# Patient Record
Sex: FEMALE | Race: WHITE | Employment: FULL TIME | URBAN - METROPOLITAN AREA
[De-identification: names, ages, dates, MRNs, and addresses within clinical notes are randomized per-mention and may not be internally consistent; named-entity substitution may affect disease eponyms.]

---

## 2017-03-08 ENCOUNTER — EMPLOYEE WELLNESS (OUTPATIENT)
Dept: OTHER | Age: 32
End: 2017-03-08

## 2017-07-19 ENCOUNTER — OFFICE VISIT (OUTPATIENT)
Dept: FAMILY MEDICINE CLINIC | Age: 32
End: 2017-07-19

## 2017-07-19 VITALS
HEART RATE: 72 BPM | OXYGEN SATURATION: 98 % | SYSTOLIC BLOOD PRESSURE: 104 MMHG | WEIGHT: 165 LBS | DIASTOLIC BLOOD PRESSURE: 60 MMHG | RESPIRATION RATE: 16 BRPM

## 2017-07-19 DIAGNOSIS — K64.9 HEMORRHOIDS, UNSPECIFIED HEMORRHOID TYPE: ICD-10-CM

## 2017-07-19 DIAGNOSIS — Z00.00 ANNUAL PHYSICAL EXAM: Primary | ICD-10-CM

## 2017-07-19 DIAGNOSIS — K59.00 CONSTIPATION, UNSPECIFIED CONSTIPATION TYPE: ICD-10-CM

## 2017-07-19 PROCEDURE — 99385 PREV VISIT NEW AGE 18-39: CPT | Performed by: FAMILY MEDICINE

## 2018-03-20 VITALS — WEIGHT: 157 LBS

## 2018-05-08 ENCOUNTER — EMPLOYEE WELLNESS (OUTPATIENT)
Dept: OTHER | Age: 33
End: 2018-05-08

## 2018-05-08 LAB
CHOLESTEROL, TOTAL: 163 MG/DL (ref 0–199)
GLUCOSE BLD-MCNC: 102 MG/DL (ref 70–99)
HDLC SERPL-MCNC: 44 MG/DL (ref 40–60)
LDL CHOLESTEROL CALCULATED: 97 MG/DL
TRIGL SERPL-MCNC: 108 MG/DL (ref 0–150)

## 2018-05-14 VITALS — WEIGHT: 166 LBS

## 2018-09-12 ENCOUNTER — TELEPHONE (OUTPATIENT)
Dept: FAMILY MEDICINE CLINIC | Age: 33
End: 2018-09-12

## 2018-09-12 ENCOUNTER — OFFICE VISIT (OUTPATIENT)
Dept: FAMILY MEDICINE CLINIC | Age: 33
End: 2018-09-12

## 2018-09-12 VITALS
WEIGHT: 171 LBS | SYSTOLIC BLOOD PRESSURE: 122 MMHG | OXYGEN SATURATION: 99 % | DIASTOLIC BLOOD PRESSURE: 70 MMHG | HEART RATE: 75 BPM | HEIGHT: 67 IN | BODY MASS INDEX: 26.84 KG/M2

## 2018-09-12 DIAGNOSIS — Z23 NEED FOR DIPHTHERIA-TETANUS-PERTUSSIS (TDAP) VACCINE: ICD-10-CM

## 2018-09-12 DIAGNOSIS — Z23 NEED FOR HEPATITIS A VACCINATION: ICD-10-CM

## 2018-09-12 DIAGNOSIS — Z71.84 COUNSELING ABOUT TRAVEL: ICD-10-CM

## 2018-09-12 DIAGNOSIS — Z01.84 IMMUNITY TO HEPATITIS B VIRUS DEMONSTRATED BY SEROLOGIC TEST: ICD-10-CM

## 2018-09-12 DIAGNOSIS — R73.01 IFG (IMPAIRED FASTING GLUCOSE): Primary | ICD-10-CM

## 2018-09-12 DIAGNOSIS — Z23 NEEDS FLU SHOT: ICD-10-CM

## 2018-09-12 LAB — HBV SURFACE AB TITR SER: 18.48 MIU/ML

## 2018-09-12 PROCEDURE — 90632 HEPA VACCINE ADULT IM: CPT | Performed by: FAMILY MEDICINE

## 2018-09-12 PROCEDURE — 99213 OFFICE O/P EST LOW 20 MIN: CPT | Performed by: FAMILY MEDICINE

## 2018-09-12 PROCEDURE — 36415 COLL VENOUS BLD VENIPUNCTURE: CPT | Performed by: FAMILY MEDICINE

## 2018-09-12 PROCEDURE — 90471 IMMUNIZATION ADMIN: CPT | Performed by: FAMILY MEDICINE

## 2018-09-12 PROCEDURE — 90682 RIV4 VACC RECOMBINANT DNA IM: CPT | Performed by: FAMILY MEDICINE

## 2018-09-12 PROCEDURE — 90472 IMMUNIZATION ADMIN EACH ADD: CPT | Performed by: FAMILY MEDICINE

## 2018-09-12 PROCEDURE — 90715 TDAP VACCINE 7 YRS/> IM: CPT | Performed by: FAMILY MEDICINE

## 2018-09-12 RX ORDER — MEFLOQUINE HYDROCHLORIDE 250 MG/1
TABLET ORAL
Qty: 8 TABLET | Refills: 0 | Status: ON HOLD | OUTPATIENT
Start: 2018-09-12 | End: 2020-08-02

## 2018-09-12 RX ORDER — AZITHROMYCIN 250 MG/1
TABLET, FILM COATED ORAL
Qty: 6 TABLET | Refills: 0 | Status: SHIPPED | OUTPATIENT
Start: 2018-09-12 | End: 2019-07-02

## 2018-09-12 ASSESSMENT — PATIENT HEALTH QUESTIONNAIRE - PHQ9
2. FEELING DOWN, DEPRESSED OR HOPELESS: 0
1. LITTLE INTEREST OR PLEASURE IN DOING THINGS: 0
SUM OF ALL RESPONSES TO PHQ QUESTIONS 1-9: 0
SUM OF ALL RESPONSES TO PHQ9 QUESTIONS 1 & 2: 0
SUM OF ALL RESPONSES TO PHQ QUESTIONS 1-9: 0

## 2018-09-12 NOTE — PROGRESS NOTES
Vaccine Information Sheet, \"Influenza - Inactivated\"  given to Ernestina Green, or parent/legal guardian of  Ernestina Green and verbalized understanding. Patient responses:    Have you ever had a reaction to a flu vaccine? No  Are you able to eat eggs without adverse effects? Yes  Do you have any current illness? No  Have you ever had Guillian [place] Syndrome? No    Flu vaccine given per order. Please see immunization tab.

## 2018-09-12 NOTE — TELEPHONE ENCOUNTER
Pls tell her that I read more about traveler's diarrhea. A minimum of 72 hrs has to pass between her last typhoid vaccine pill and taking the abx. So, since she'll finish the 4 doses of the vaccine before she travels, there won't be any issue with taking an abx if she develops diarrhea. I sent in a rx for azithromycin to her [facility]. At her visit, I mentioned cipro, but azithromycin is a better choice.  Thx.

## 2018-09-12 NOTE — PROGRESS NOTES
Patient: Marley Arita is a 35 y.o. female who presents today with the following Chief Complaint(s):  Chief Complaint   Patient presents with    Annual Exam         HPI: Is here for [facility] follow up as fbs was 102. No sx/sx of high or low bs. Is traveling to [facility], [facility] and the [facility] soon. Leaves 10/27/18. According to [facility] travel site, pt needs Tdap, flu shots, hep A to complete routine vaccines. Needs maleria prophylaxis for [facility], mefloquine 250 mg 1 qwk starting 1 wk before travel, during and for 4 wk after travel. . Also needs typhoid prophylaxis. PO is more effective rickey killed injetable version, and pt will take 1 qod x 4 days at least 1 wk prior to travel. Current Outpatient Prescriptions   Medication Sig Dispense Refill    mefloquine (LARIAM) 250 MG tablet 1 po q week starting 1 wk before travel, each week of travel and for 4 wks after travel. 8 tablet 0    typhoid vaccine (VIVOTIF) delayed release capsule 1 po qod x 4 doses. Take at least 1 wk before travel. 4 capsule 0    azithromycin (ZITHROMAX) 250 MG tablet 1 po bid x 3 day for traveler's diarrhea 6 tablet 0     No current facility-administered medications for this visit. Patient's past medical history, surgical history, family history, medications,  and allergies  were all reviewed and updated as appropriate today. Review of Systems   Constitutional: Negative for fatigue and fever. Endocrine: Negative for polydipsia, polyphagia and polyuria. Psychiatric/Behavioral: Negative for dysphoric mood. The patient is not nervous/anxious. Physical Exam   Constitutional: She is oriented to person, place, and time. She appears well-developed and well-nourished. HENT:   Head: Normocephalic and atraumatic. Eyes: Conjunctivae and EOM are normal. No scleral icterus. Pulmonary/Chest: Effort normal.   Musculoskeletal: Normal range of motion. Neurological: She is alert and oriented to person, place, and time. Skin: Skin is warm and dry. Psychiatric: She has a normal mood and affect. Her behavior is normal. Judgment and thought content normal.         /70 (Site: Left Upper Arm, Position: Sitting, Cuff Size: Medium Adult)   Pulse 75   Ht 5' 7\" (1.702 m)   Wt 171 lb (77.6 kg)   LMP 08/29/2018   SpO2 99%   BMI 26.78 kg/m²     Assessment/Plan:    Kat Benson was seen today for annual exam.    Diagnoses and all orders for this visit:    IFG (impaired fasting glucose)  -     Hemoglobin A1C    Needs flu shot  -     INFLUENZA, QUADV, RECOMBINANT, 18 YRS AND OLDER, IM, PF, PREFILL SYR OR SDV, 0.5ML (FLUBLOK QUADV, PF)    Need for diphtheria-tetanus-pertussis (Tdap) vaccine  -     Tdap (age 6y and older) IM ([employer])    Counseling about travel  -     mefloquine (LARIAM) 250 MG tablet; 1 po q week starting 1 wk before travel, each week of travel and for 4 wks after travel. -     typhoid vaccine (VIVOTIF) delayed release capsule; 1 po qod x 4 doses. Take at least 1 wk before travel.  -     azithromycin (ZITHROMAX) 250 MG tablet; 1 po bid x 3 day for traveler's diarrhea    Need for hepatitis A vaccination  -     Hep A Vaccine Adult (HAVRIX), 2nd vaccine in 6 mo. Immunity to hepatitis B virus demonstrated by serologic test  -     HEPATITIS B SURFACE ANTIBODY EMPLOYEE   Pt believes she has had vaccine x 3.

## 2018-09-13 LAB
ESTIMATED AVERAGE GLUCOSE: 85.3 MG/DL
HBA1C MFR BLD: 4.6 %

## 2018-11-27 ENCOUNTER — OFFICE VISIT (OUTPATIENT)
Dept: DERMATOLOGY | Age: 33
End: 2018-11-27
Payer: COMMERCIAL

## 2018-11-27 DIAGNOSIS — L72.0 EPIDERMAL CYST: Primary | ICD-10-CM

## 2018-11-27 PROCEDURE — 99213 OFFICE O/P EST LOW 20 MIN: CPT | Performed by: DERMATOLOGY

## 2018-11-27 RX ORDER — CEPHALEXIN 500 MG/1
CAPSULE ORAL
Qty: 28 CAPSULE | Refills: 0 | Status: SHIPPED | OUTPATIENT
Start: 2018-11-27 | End: 2019-07-02

## 2018-11-27 NOTE — PROGRESS NOTES
if lesion becomes more inflamed. Discussed with patient that if it is increasing in size, lesion needs excision or biopsy.

## 2019-05-24 ENCOUNTER — EMPLOYEE WELLNESS (OUTPATIENT)
Dept: OTHER | Age: 34
End: 2019-05-24

## 2019-05-24 LAB
CHOLESTEROL, TOTAL: 190 MG/DL (ref 0–199)
GLUCOSE BLD-MCNC: 107 MG/DL (ref 70–99)
HDLC SERPL-MCNC: 53 MG/DL (ref 40–60)
LDL CHOLESTEROL CALCULATED: 119 MG/DL
TRIGL SERPL-MCNC: 89 MG/DL (ref 0–150)

## 2019-05-28 VITALS — WEIGHT: 177 LBS | BODY MASS INDEX: 27.72 KG/M2

## 2019-07-02 ENCOUNTER — OFFICE VISIT (OUTPATIENT)
Dept: ORTHOPEDIC SURGERY | Age: 34
End: 2019-07-02
Payer: COMMERCIAL

## 2019-07-02 VITALS — BODY MASS INDEX: 27.79 KG/M2 | HEIGHT: 67 IN | RESPIRATION RATE: 15 BRPM | WEIGHT: 177.03 LBS

## 2019-07-02 DIAGNOSIS — S62.667A CLOSED NONDISPLACED FRACTURE OF DISTAL PHALANX OF LEFT LITTLE FINGER, INITIAL ENCOUNTER: ICD-10-CM

## 2019-07-02 DIAGNOSIS — M79.645 FINGER PAIN, LEFT: Primary | ICD-10-CM

## 2019-07-02 PROCEDURE — 99202 OFFICE O/P NEW SF 15 MIN: CPT | Performed by: ORTHOPAEDIC SURGERY

## 2019-07-02 NOTE — PROGRESS NOTES
normal    Circulation: well perfused    Additional Comments:     Additional Examinations:  Right Upper Extremity:  Examination of the right upper extremity does not show any tenderness, deformity or injury. Range of motion is unremarkable. There is no gross instability. There are no rashes, ulcerations or lesions. Strength and tone are normal.       Radiology:     X-rays obtained and reviewed in office:  Views 3 views  Location left small finger  Impression rays demonstrate well-maintained alignment and IP and MP joint congruence, but there is what appears to be a nondisplaced fracture through the tuft of the left small finger distal phalanx      Assessment: Left small finger crush injury with nondisplaced distal phalanx tuft fracture    Impression:   Encounter Diagnoses   Name Primary?  Finger pain, left Yes    Closed nondisplaced fracture of distal phalanx of left little finger, initial encounter        Office Procedures:  Orders Placed This Encounter   Procedures    XR FINGER LEFT (MIN 2 VIEWS)     Standing Status:   Future     Number of Occurrences:   1     Standing Expiration Date:   7/2/2020     Order Specific Question:   Reason for exam:     Answer:   LT SMALL FINGER       Treatment Plan: I believe this will heal nicely with simple conservative care. We talked about self limiting activities to minimize repeated aggravation, use of bandaging and simple wound care, and even Covan wrapping. 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 I will be happy to see her back as needed moving forward. Please note that this transcription was created using voice recognition software. Any errors are unintentional and may be due to voice recognition transcription.

## 2019-12-06 LAB
C. TRACHOMATIS, EXTERNAL RESULT: NEGATIVE
N. GONORRHOEAE, EXTERNAL RESULT: NEGATIVE

## 2020-02-07 LAB
HEP B, EXTERNAL RESULT: NEGATIVE
HEPATITIS C ANTIBODY, EXTERNAL RESULT: NEGATIVE
RPR, EXTERNAL RESULT: NONREACTIVE
RUBELLA TITER, EXTERNAL RESULT: NORMAL

## 2020-04-27 ENCOUNTER — HOSPITAL ENCOUNTER (OUTPATIENT)
Dept: OBGYN | Age: 35
Discharge: HOME | End: 2020-04-27

## 2020-04-27 PROCEDURE — 9900000105 HC CHILDBIRTH 2 E-CLASS

## 2020-07-17 LAB — GBS, EXTERNAL RESULT: NEGATIVE

## 2020-07-28 ENCOUNTER — OFFICE VISIT (OUTPATIENT)
Dept: PRIMARY CARE CLINIC | Age: 35
End: 2020-07-28
Payer: COMMERCIAL

## 2020-07-28 PROCEDURE — 99211 OFF/OP EST MAY X REQ PHY/QHP: CPT | Performed by: NURSE PRACTITIONER

## 2020-07-28 NOTE — PROGRESS NOTES
Patient presented to [facility] drive up clinic for preop testing. Patient was swabbed and given information advising them to remain isolated until procedure date.

## 2020-07-29 LAB
SARS-COV-2: NOT DETECTED
SOURCE: NORMAL

## 2020-08-02 ENCOUNTER — HOSPITAL ENCOUNTER (INPATIENT)
Age: 35
LOS: 2 days | Discharge: HOME | End: 2020-08-04
Attending: OBSTETRICS & GYNECOLOGY | Admitting: OBSTETRICS & GYNECOLOGY
Payer: COMMERCIAL

## 2020-08-02 ENCOUNTER — ANESTHESIA (OUTPATIENT)
Dept: LABOR AND DELIVERY | Age: 35
End: 2020-08-02
Payer: COMMERCIAL

## 2020-08-02 ENCOUNTER — ANESTHESIA EVENT (OUTPATIENT)
Dept: LABOR AND DELIVERY | Age: 35
End: 2020-08-02
Payer: COMMERCIAL

## 2020-08-02 PROBLEM — Z37.9 NORMAL LABOR: Status: ACTIVE | Noted: 2020-08-02

## 2020-08-02 LAB
ABO/RH: NORMAL
AMNIOTEST, POC: NORMAL
AMPHETAMINE SCREEN, URINE: NORMAL
ANTIBODY SCREEN: NORMAL
BARBITURATE SCREEN URINE: NORMAL
BASOPHILS ABSOLUTE: 0.1 K/UL (ref 0–0.2)
BASOPHILS RELATIVE PERCENT: 0.6 %
BENZODIAZEPINE SCREEN, URINE: NORMAL
BUPRENORPHINE URINE: NORMAL
CANNABINOID SCREEN URINE: NORMAL
COCAINE METABOLITE SCREEN URINE: NORMAL
EOSINOPHILS ABSOLUTE: 0.1 K/UL (ref 0–0.6)
EOSINOPHILS RELATIVE PERCENT: 1 %
HCT VFR BLD CALC: 37.8 % (ref 36–48)
HEMOGLOBIN: 13.4 G/DL (ref 12–16)
LYMPHOCYTES ABSOLUTE: 1.8 K/UL (ref 1–5.1)
LYMPHOCYTES RELATIVE PERCENT: 16.6 %
Lab: NORMAL
Lab: NORMAL
MCH RBC QN AUTO: 32.9 PG (ref 26–34)
MCHC RBC AUTO-ENTMCNC: 35.4 G/DL (ref 31–36)
MCV RBC AUTO: 93 FL (ref 80–100)
METHADONE SCREEN, URINE: NORMAL
MONOCYTES ABSOLUTE: 0.8 K/UL (ref 0–1.3)
MONOCYTES RELATIVE PERCENT: 7.2 %
NEGATIVE QC PASS/FAIL: NORMAL
NEUTROPHILS ABSOLUTE: 7.9 K/UL (ref 1.7–7.7)
NEUTROPHILS RELATIVE PERCENT: 74.6 %
OPIATE SCREEN URINE: NORMAL
OXYCODONE URINE: NORMAL
PDW BLD-RTO: 14.1 % (ref 12.4–15.4)
PH UA: 6
PHENCYCLIDINE SCREEN URINE: NORMAL
PLATELET # BLD: 233 K/UL (ref 135–450)
PMV BLD AUTO: 7.8 FL (ref 5–10.5)
POSITIVE QC PASS/FAIL: NORMAL
PROPOXYPHENE SCREEN: NORMAL
RBC # BLD: 4.06 M/UL (ref 4–5.2)
TOTAL SYPHILLIS IGG/IGM: NORMAL
WBC # BLD: 10.6 K/UL (ref 4–11)

## 2020-08-02 PROCEDURE — 6360000002 HC RX W HCPCS: Performed by: ANESTHESIOLOGY

## 2020-08-02 PROCEDURE — 59025 FETAL NON-STRESS TEST: CPT

## 2020-08-02 PROCEDURE — 3700000025 EPIDURAL BLOCK: Performed by: ANESTHESIOLOGY

## 2020-08-02 PROCEDURE — 2500000003 HC RX 250 WO HCPCS: Performed by: NURSE ANESTHETIST, CERTIFIED REGISTERED

## 2020-08-02 PROCEDURE — 2580000003 HC RX 258: Performed by: OBSTETRICS & GYNECOLOGY

## 2020-08-02 PROCEDURE — 86901 BLOOD TYPING SEROLOGIC RH(D): CPT

## 2020-08-02 PROCEDURE — 86780 TREPONEMA PALLIDUM: CPT

## 2020-08-02 PROCEDURE — 7200000001 HC VAGINAL DELIVERY

## 2020-08-02 PROCEDURE — 85025 COMPLETE CBC W/AUTO DIFF WBC: CPT

## 2020-08-02 PROCEDURE — 2500000003 HC RX 250 WO HCPCS: Performed by: OBSTETRICS & GYNECOLOGY

## 2020-08-02 PROCEDURE — 86850 RBC ANTIBODY SCREEN: CPT

## 2020-08-02 PROCEDURE — 2500000003 HC RX 250 WO HCPCS

## 2020-08-02 PROCEDURE — 86900 BLOOD TYPING SEROLOGIC ABO: CPT

## 2020-08-02 PROCEDURE — 0KQM0ZZ REPAIR PERINEUM MUSCLE, OPEN APPROACH: ICD-10-PCS | Performed by: OBSTETRICS & GYNECOLOGY

## 2020-08-02 PROCEDURE — 10H07YZ INSERTION OF OTHER DEVICE INTO PRODUCTS OF CONCEPTION, VIA NATURAL OR ARTIFICIAL OPENING: ICD-10-PCS | Performed by: OBSTETRICS & GYNECOLOGY

## 2020-08-02 PROCEDURE — 6360000002 HC RX W HCPCS: Performed by: OBSTETRICS & GYNECOLOGY

## 2020-08-02 PROCEDURE — 51701 INSERT BLADDER CATHETER: CPT

## 2020-08-02 PROCEDURE — 80307 DRUG TEST PRSMV CHEM ANLYZR: CPT

## 2020-08-02 PROCEDURE — 1220000000 HC SEMI PRIVATE OB R&B

## 2020-08-02 PROCEDURE — 10907ZC DRAINAGE OF AMNIOTIC FLUID, THERAPEUTIC FROM PRODUCTS OF CONCEPTION, VIA NATURAL OR ARTIFICIAL OPENING: ICD-10-PCS | Performed by: OBSTETRICS & GYNECOLOGY

## 2020-08-02 RX ORDER — SODIUM CHLORIDE 0.9 % (FLUSH) 0.9 %
10 SYRINGE (ML) INJECTION EVERY 12 HOURS SCHEDULED
Status: DISCONTINUED | OUTPATIENT
Start: 2020-08-02 | End: 2020-08-02 | Stop reason: HOSPADM

## 2020-08-02 RX ORDER — ONDANSETRON 2 MG/ML
4 INJECTION INTRAMUSCULAR; INTRAVENOUS EVERY 6 HOURS PRN
Status: DISCONTINUED | OUTPATIENT
Start: 2020-08-02 | End: 2020-08-02 | Stop reason: HOSPADM

## 2020-08-02 RX ORDER — DOCUSATE SODIUM 100 MG/1
100 CAPSULE, LIQUID FILLED ORAL 2 TIMES DAILY
Status: DISCONTINUED | OUTPATIENT
Start: 2020-08-02 | End: 2020-08-03 | Stop reason: SDUPTHER

## 2020-08-02 RX ORDER — ACETAMINOPHEN 325 MG/1
650 TABLET ORAL EVERY 4 HOURS PRN
Status: DISCONTINUED | OUTPATIENT
Start: 2020-08-02 | End: 2020-08-03 | Stop reason: SDUPTHER

## 2020-08-02 RX ORDER — NALOXONE HYDROCHLORIDE 0.4 MG/ML
0.4 INJECTION, SOLUTION INTRAMUSCULAR; INTRAVENOUS; SUBCUTANEOUS PRN
Status: DISCONTINUED | OUTPATIENT
Start: 2020-08-02 | End: 2020-08-02 | Stop reason: HOSPADM

## 2020-08-02 RX ORDER — DIPHENHYDRAMINE HYDROCHLORIDE 50 MG/ML
25 INJECTION INTRAMUSCULAR; INTRAVENOUS EVERY 6 HOURS PRN
Status: DISCONTINUED | OUTPATIENT
Start: 2020-08-02 | End: 2020-08-02 | Stop reason: HOSPADM

## 2020-08-02 RX ORDER — EPHEDRINE SULFATE 50 MG/ML
INJECTION INTRAVENOUS PRN
Status: DISCONTINUED | OUTPATIENT
Start: 2020-08-02 | End: 2020-08-02 | Stop reason: SDUPTHER

## 2020-08-02 RX ORDER — SODIUM CHLORIDE, SODIUM LACTATE, POTASSIUM CHLORIDE, CALCIUM CHLORIDE 600; 310; 30; 20 MG/100ML; MG/100ML; MG/100ML; MG/100ML
INJECTION, SOLUTION INTRAVENOUS CONTINUOUS
Status: DISCONTINUED | OUTPATIENT
Start: 2020-08-02 | End: 2020-08-03

## 2020-08-02 RX ORDER — NALBUPHINE HCL 10 MG/ML
5 AMPUL (ML) INJECTION EVERY 4 HOURS PRN
Status: DISCONTINUED | OUTPATIENT
Start: 2020-08-02 | End: 2020-08-02 | Stop reason: HOSPADM

## 2020-08-02 RX ORDER — SODIUM CHLORIDE 0.9 % (FLUSH) 0.9 %
10 SYRINGE (ML) INJECTION PRN
Status: DISCONTINUED | OUTPATIENT
Start: 2020-08-02 | End: 2020-08-02 | Stop reason: HOSPADM

## 2020-08-02 RX ORDER — EPHEDRINE SULFATE 50 MG/ML
INJECTION INTRAVENOUS
Status: COMPLETED
Start: 2020-08-02 | End: 2020-08-02

## 2020-08-02 RX ADMIN — EPHEDRINE SULFATE 10 MG: 50 INJECTION INTRAVENOUS at 11:51

## 2020-08-02 RX ADMIN — ONDANSETRON 4 MG: 2 INJECTION INTRAMUSCULAR; INTRAVENOUS at 18:38

## 2020-08-02 RX ADMIN — Medication 5 ML: at 08:35

## 2020-08-02 RX ADMIN — SODIUM CHLORIDE, POTASSIUM CHLORIDE, SODIUM LACTATE AND CALCIUM CHLORIDE: 600; 310; 30; 20 INJECTION, SOLUTION INTRAVENOUS at 11:11

## 2020-08-02 RX ADMIN — SODIUM CHLORIDE, POTASSIUM CHLORIDE, SODIUM LACTATE AND CALCIUM CHLORIDE: 600; 310; 30; 20 INJECTION, SOLUTION INTRAVENOUS at 16:26

## 2020-08-02 RX ADMIN — Medication 5 ML: at 08:30

## 2020-08-02 RX ADMIN — FAMOTIDINE 20 MG: 10 INJECTION, SOLUTION INTRAVENOUS at 18:33

## 2020-08-02 RX ADMIN — Medication 1 MILLI-UNITS/MIN: at 09:30

## 2020-08-02 RX ADMIN — SODIUM CHLORIDE, POTASSIUM CHLORIDE, SODIUM LACTATE AND CALCIUM CHLORIDE: 600; 310; 30; 20 INJECTION, SOLUTION INTRAVENOUS at 06:13

## 2020-08-02 RX ADMIN — EPHEDRINE SULFATE 10 MG: 50 INJECTION INTRAVENOUS at 11:45

## 2020-08-02 RX ADMIN — SODIUM CHLORIDE, POTASSIUM CHLORIDE, SODIUM LACTATE AND CALCIUM CHLORIDE: 600; 310; 30; 20 INJECTION, SOLUTION INTRAVENOUS at 08:29

## 2020-08-02 ASSESSMENT — PAIN DESCRIPTION - DESCRIPTORS
DESCRIPTORS: CRAMPING
DESCRIPTORS: SHARP
DESCRIPTORS: SHARP
DESCRIPTORS: CRAMPING
DESCRIPTORS: SHARP
DESCRIPTORS: CRAMPING

## 2020-08-02 NOTE — FLOWSHEET NOTE
Pt admitted to labor room 9181 with FOB at bedside. Baby on monitor, pt experiencing contractions and tolerable. All consents discussed and signed. No further needs at this time, call light in reach will cont to monitor.

## 2020-08-02 NOTE — PROGRESS NOTES
Pt comfortable s/p epidural.    FHT: 145, mod variability, +accels, no decels  [place]: q 3-5 min  SVE: 4+/90/-2, AROM with small amount clear fluid    Begin Pitocin for augmentation.

## 2020-08-02 NOTE — FLOWSHEET NOTE
Pt transferred ambulatory to room 2286 for admission of labor. Report to HARRIS Celis RN to assume care

## 2020-08-02 NOTE — PROGRESS NOTES
Increasing fetal heart rate with intermittent variable and early decels. Pt comfortable. FHT: 160, mod variability, +acels, early and variable decels  [place]: q 3 min  SVE: 4-5/90 with puffy anterior lip/0, some molding    IUPC placed. Discussed concerns with lack of cervical change and fetal baseline. Continue to monitor closely.

## 2020-08-02 NOTE — FLOWSHEET NOTE
Pitt cath placed at this time and return of 600cc clear yellow urine. Pt positioned to right side. Pt denies needs or pain. Popsicle provided.

## 2020-08-02 NOTE — ANESTHESIA PROCEDURE NOTES
Epidural Block    Patient location during procedure: OB  Start time: 8/2/2020 8:17 AM  End time: 8/2/2020 8:45 AM  Reason for block: labor epidural  Staffing  Anesthesiologist: Reza Bocanegra MD  Resident/CRNA: Arville Duane, APRN - CRNA  Performed: resident/CRNA   Preanesthetic Checklist  Completed: patient identified, site marked, surgical consent, pre-op evaluation, timeout performed, IV checked, risks and benefits discussed, monitors and equipment checked, anesthesia consent given, oxygen available and patient being monitored  Epidural  Patient position: sitting  Prep: Betadine and site prepped and draped  Patient monitoring: continuous pulse ox and frequent blood pressure checks  Approach: midline  Location: lumbar (1-5) (L3-4)  Injection technique: ANGEL saline  Provider prep: mask and sterile gloves  Needle  Needle type: Tuohy   Needle gauge: 17 G  Needle length: 3.5 in  Needle insertion depth: 6 cm  Catheter type: side hole  Catheter size: 19 G  Catheter at skin depth: 12 cm  Test dose: negative  Assessment  Sensory level: T6  Hemodynamics: stable  Attempts: 1

## 2020-08-02 NOTE — FLOWSHEET NOTE
08/02/20 0730   Fetal Heart Rate   Mode External US   Baseline Rate 145 bpm   Baseline Classification Normal   Variability 6-25 BPM   Pattern A   Patient Feels Fetal Movement Yes   Uterine Activity   Mode Palpation; Lares   Contraction Frequency 2-3   Contraction Duration 60   Contraction Quality Mild; Moderate   Resting Tone Palpated Soft

## 2020-08-02 NOTE — FLOWSHEET NOTE
Dr Lenell Closs viewed tracing. Plan to recheck cervix at approx 1500 to evaluate for cervical change. Pt updated on plan of care. Pt verbalized understanding. Pt positioned to sit up for a little bit. HOB raised to semi fowlers. Spouse remains at bedside. Pt denies pain or needs at this time.

## 2020-08-02 NOTE — FLOWSHEET NOTE
Dr Bryanna Stanford called and informed of 27 yo  EDC  EGA 44 presenting with c/o of leaking of fluid earlier but none recently and contractions. Pt with contractions q 1.5-2.5 x50-90,  +ltv&accels noted, SVE 4/70/-2 BOW felt on exam and amniotest was negative and vaginal vault felt very dry with no leaking of fluid, GBS negative.   New orders received

## 2020-08-02 NOTE — ANESTHESIA PRE PROCEDURE
part of Cervix removed , LEEP       Social History:    Social History     Tobacco Use    Smoking status: Never Smoker    Smokeless tobacco: Never Used   Substance Use Topics    Alcohol use: Yes     Comment: 6 per wk                                Counseling given: Not Answered      Vital Signs (Current):   Vitals:    08/02/20 0803 08/02/20 0828 08/02/20 0830 08/02/20 0832   BP: 128/76 128/79 (!) 140/86 120/70   Pulse: 97 93 81 96   Resp: 18 16 16 16   Temp:       TempSrc:       Weight:       Height:                                                  BP Readings from Last 3 Encounters:   08/02/20 120/70   09/12/18 122/70   07/19/17 104/60       NPO Status:                                                                                 BMI:   Wt Readings from Last 3 Encounters:   08/02/20 191 lb 3.2 oz (86.7 kg)   07/02/19 177 lb 0.5 oz (80.3 kg)   05/24/19 177 lb (80.3 kg)     Body mass index is 29.5 kg/m². CBC:   Lab Results   Component Value Date    WBC 10.6 08/02/2020    RBC 4.06 08/02/2020    HGB 13.4 08/02/2020    HCT 37.8 08/02/2020    MCV 93.0 08/02/2020    RDW 14.1 08/02/2020     08/02/2020       CMP:   Lab Results   Component Value Date    GLUCOSE 107 05/24/2019       POC Tests: No results for input(s): POCGLU, POCNA, POCK, POCCL, POCBUN, POCHEMO, POCHCT in the last 72 hours.     Coags: No results found for: PROTIME, INR, APTT    HCG (If Applicable): No results found for: PREGTESTUR, PREGSERUM, HCG, HCGQUANT     ABGs: No results found for: PHART, PO2ART, WLV3VQT, PED5KWG, BEART, J0JTZUGA     Type & Screen (If Applicable):  No results found for: LABABO, LABRH    Drug/Infectious Status (If Applicable):  No results found for: HIV, HEPCAB    COVID-19 Screening (If Applicable):   Lab Results   Component Value Date    COVID19 Not Detected 07/28/2020         Anesthesia Evaluation  Patient summary reviewed no history of anesthetic complications:   Airway: Mallampati: II  TM distance: >3 FB   Neck ROM: full  Mouth opening: > = 3 FB Dental: normal exam         Pulmonary:Negative Pulmonary ROS and normal exam                               Cardiovascular:Negative CV ROS  Exercise tolerance: good (>4 METS),           Rhythm: regular  Rate: normal           Beta Blocker:  Not on Beta Blocker         Neuro/Psych:                ROS comment: Scoliosis diagnosed as a teen, no surgery/issues GI/Hepatic/Renal: Neg GI/Hepatic/Renal ROS            Endo/Other: Negative Endo/Other ROS                    Abdominal:           Vascular: negative vascular ROS. Anesthesia Plan      epidural     ASA 1             Anesthetic plan and risks discussed with patient. Use of blood products discussed with patient whom consented to blood products.                    BLANCA Ivy - CRNA   8/2/2020

## 2020-08-02 NOTE — PROGRESS NOTES
Pt feeling some contractions on the right side of her abdomen. No pelvic pressure. FHT: 150, mod variability, +accels, rare subtle late  [place]: q 2-3 min  SVE: 9/100/0 to +1; OP    Pt repositioned. Reassess shortly.

## 2020-08-02 NOTE — FLOWSHEET NOTE
Dr García Shoulders at bedside. MD states SVE unchanged, scant clear fluid noted to pad, pad changed. Pt positioned to left side at this time. Pt denies pain or needs. Order obtained for bermudez cath when next need for str cath d/t unchanged cervix.

## 2020-08-02 NOTE — H&P
Department of Obstetrics and Gynecology   Obstetrics History and Physical        CHIEF COMPLAINT:  contractions, leakage of amniotic fluid    HISTORY OF PRESENT ILLNESS:    Rafi Galarza  is a 28 y.o.  female at 39w0d presents with a chief complaint as above and is being admitted for early labor. She presented with small LOF but was found to be intact on admission. She was paige every 2-3 minutes. No VB, good FM. 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 Possible contaminant.      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 , LEEP     Allergies:  Sulfa antibiotics  Social History:    Social History     Socioeconomic History    Marital status:      Spouse name: Not on file    Number of children: Not on file    Years of education: Not on file    Highest education level: Not on file   Occupational History    Not on file   Social Needs    Financial resource strain: Not on file    Food insecurity     Worry: Not on file     Inability: Not on file    Transportation needs     Medical: Not on file     Non-medical: Not on file   Tobacco Use    Smoking status: Never Smoker    Smokeless tobacco: Never Used   Substance and Sexual Activity    Alcohol use: Yes     Comment: 6 per wk    Drug use: No    Sexual activity: Not on file   Lifestyle    Physical activity     Days per week: Not on file     Minutes per session: Not on file    Stress: Not on file   Relationships    Social connections     Talks on phone: Not on file     Gets together: Not on file     Attends Latter-day service: Not on file     Active member of club or organization: Not on file     Attends meetings of clubs or organizations: Not on file     Relationship status: Not on file    Intimate partner violence     Fear of current or ex partner: Not on file     Emotionally abused: Not on file     Physically abused: Not on file     Forced sexual activity: Not on file   Other Topics Concern    Not on file   Social History Narrative    Not on file     Family History:       Problem Relation Age of Onset    High Blood Pressure Mother     Cancer Maternal Aunt         breast     High Blood Pressure Maternal Grandmother     Diabetes Father     Cancer Paternal Grandfather         lung     Medications Prior to Admission:  Medications Prior to Admission: [DISCONTINUED] mefloquine (LARIAM) 250 MG tablet, 1 po q week starting 1 wk before travel, each week of travel and for 4 wks after travel. [DISCONTINUED] typhoid vaccine (VIVOTIF) delayed release capsule, 1 po qod x 4 doses. Take at least 1 wk before travel. REVIEW OF SYSTEMS:  negative     PHYSICAL EXAM:    Vitals:    08/02/20 0803 08/02/20 0828 08/02/20 0830 08/02/20 0832   BP: 128/76 128/79 (!) 140/86 120/70   Pulse: 97 93 81 96   Resp: 18 16 16 16   Temp:       TempSrc:       Weight:       Height:         General appearance:  awake, alert, cooperative, no apparent distress, and appears stated age  Neurologic:  Awake, alert, oriented to name, place and time.     Lungs:  No increased work of breathing, good air exchange  Abdomen:  Soft, non tender, gravid, fundal height consistent with the gestational age, EFW by Leopold's maneuvers is 7 lb 8 oz  Pelvis:  Adequate pelvis  Cervix: 4/70/-2 with BBOW on admission  Contraction frequency: every 2-4 minutes  Membranes:  Intact  Labs: GBS negative, COVID negative     ASSESSMENT:  Pt is a 34y/o

## 2020-08-02 NOTE — PLAN OF CARE
Problem: Pain:  Description: Pain management should include both nonpharmacologic and pharmacologic interventions.   Goal: Pain level will decrease  Description: Pain level will decrease  Outcome: Ongoing  Goal: Control of acute pain  Description: Control of acute pain  Outcome: Ongoing  Goal: Control of chronic pain  Description: Control of chronic pain  Outcome: Ongoing     Problem: Anxiety:  Goal: Level of anxiety will decrease  Description: Level of anxiety will decrease  Outcome: Ongoing     Problem: Breathing Pattern - Ineffective:  Goal: Able to breathe comfortably  Description: Able to breathe comfortably  Outcome: Ongoing     Problem: Fluid Volume - Imbalance:  Goal: Absence of imbalanced fluid volume signs and symptoms  Description: Absence of imbalanced fluid volume signs and symptoms  Outcome: Ongoing  Goal: Absence of intrapartum hemorrhage signs and symptoms  Description: Absence of intrapartum hemorrhage signs and symptoms  Outcome: Ongoing     Problem: Infection - Intrapartum Infection:  Goal: Will show no infection signs and symptoms  Description: Will show no infection signs and symptoms  Outcome: Ongoing     Problem: Labor Process - Prolonged:  Goal: Labor progression, first stage, within specified pattern  Description: Labor progression, first stage, within specified pattern  Outcome: Ongoing  Goal: Labor progession, second stage, within specified pattern  Description: Labor progession, second stage, within specified pattern  Outcome: Ongoing  Goal: Uterine contractions within specified parameters  Description: Uterine contractions within specified parameters  Outcome: Ongoing     Problem:  Screening:  Goal: Ability to make informed decisions regarding treatment has improved  Description: Ability to make informed decisions regarding treatment has improved  Outcome: Ongoing     Problem: Pain - Acute:  Goal: Able to cope with pain  Description: Able to cope with pain  Outcome: Ongoing Problem: Tissue Perfusion - Uteroplacental, Altered:  Description: [TRUNCATED] For intrapartum patients with recurrent variable decelerations of the fetal heart rate, consider transcervical amnioinfusion. For patients in labor, avoid prophylactic use of continuous maternal oxygen supplementation to prevent nonreassu . ..   Goal: Absence of abnormal fetal heart rate pattern  Description: Absence of abnormal fetal heart rate pattern  Outcome: Ongoing     Problem: Urinary Retention:  Goal: Experiences of bladder distention will decrease  Description: Experiences of bladder distention will decrease  Outcome: Ongoing  Goal: Urinary elimination within specified parameters  Description: Urinary elimination within specified parameters  Outcome: Ongoing

## 2020-08-02 NOTE — FLOWSHEET NOTE
Birthing ball  Being used at bedside and pt leaning forward onto bed. EFM tracing maternal HR at times. Spot check of -150. Pt states she is still tolerating ctx but will want and epi sometime soon.

## 2020-08-03 PROCEDURE — 1220000000 HC SEMI PRIVATE OB R&B

## 2020-08-03 PROCEDURE — 6370000000 HC RX 637 (ALT 250 FOR IP): Performed by: OBSTETRICS & GYNECOLOGY

## 2020-08-03 RX ORDER — DOCUSATE SODIUM 100 MG/1
100 CAPSULE, LIQUID FILLED ORAL 2 TIMES DAILY
Status: DISCONTINUED | OUTPATIENT
Start: 2020-08-03 | End: 2020-08-04 | Stop reason: HOSPADM

## 2020-08-03 RX ORDER — ONDANSETRON 4 MG/1
8 TABLET, ORALLY DISINTEGRATING ORAL EVERY 8 HOURS PRN
Status: DISCONTINUED | OUTPATIENT
Start: 2020-08-03 | End: 2020-08-04 | Stop reason: HOSPADM

## 2020-08-03 RX ORDER — LANOLIN 100 %
OINTMENT (GRAM) TOPICAL PRN
Status: DISCONTINUED | OUTPATIENT
Start: 2020-08-03 | End: 2020-08-04 | Stop reason: HOSPADM

## 2020-08-03 RX ORDER — ONDANSETRON 2 MG/ML
4 INJECTION INTRAMUSCULAR; INTRAVENOUS EVERY 6 HOURS PRN
Status: DISCONTINUED | OUTPATIENT
Start: 2020-08-03 | End: 2020-08-04 | Stop reason: HOSPADM

## 2020-08-03 RX ORDER — HYDROCODONE BITARTRATE AND ACETAMINOPHEN 5; 325 MG/1; MG/1
2 TABLET ORAL EVERY 4 HOURS PRN
Status: DISCONTINUED | OUTPATIENT
Start: 2020-08-03 | End: 2020-08-04 | Stop reason: HOSPADM

## 2020-08-03 RX ORDER — SODIUM CHLORIDE, SODIUM LACTATE, POTASSIUM CHLORIDE, CALCIUM CHLORIDE 600; 310; 30; 20 MG/100ML; MG/100ML; MG/100ML; MG/100ML
INJECTION, SOLUTION INTRAVENOUS CONTINUOUS
Status: DISCONTINUED | OUTPATIENT
Start: 2020-08-03 | End: 2020-08-04 | Stop reason: HOSPADM

## 2020-08-03 RX ORDER — ACETAMINOPHEN 325 MG/1
650 TABLET ORAL EVERY 4 HOURS PRN
Status: DISCONTINUED | OUTPATIENT
Start: 2020-08-03 | End: 2020-08-04 | Stop reason: HOSPADM

## 2020-08-03 RX ORDER — HYDROCODONE BITARTRATE AND ACETAMINOPHEN 5; 325 MG/1; MG/1
1 TABLET ORAL EVERY 4 HOURS PRN
Status: DISCONTINUED | OUTPATIENT
Start: 2020-08-03 | End: 2020-08-04 | Stop reason: HOSPADM

## 2020-08-03 RX ORDER — SODIUM CHLORIDE 0.9 % (FLUSH) 0.9 %
10 SYRINGE (ML) INJECTION PRN
Status: DISCONTINUED | OUTPATIENT
Start: 2020-08-03 | End: 2020-08-04 | Stop reason: HOSPADM

## 2020-08-03 RX ORDER — SODIUM CHLORIDE 0.9 % (FLUSH) 0.9 %
10 SYRINGE (ML) INJECTION EVERY 12 HOURS SCHEDULED
Status: DISCONTINUED | OUTPATIENT
Start: 2020-08-03 | End: 2020-08-04 | Stop reason: HOSPADM

## 2020-08-03 RX ORDER — IBUPROFEN 400 MG/1
800 TABLET ORAL EVERY 6 HOURS PRN
Status: DISCONTINUED | OUTPATIENT
Start: 2020-08-03 | End: 2020-08-04 | Stop reason: HOSPADM

## 2020-08-03 RX ADMIN — BENZOCAINE AND LEVOMENTHOL: 200; 5 SPRAY TOPICAL at 18:56

## 2020-08-03 RX ADMIN — ACETAMINOPHEN 650 MG: 325 TABLET ORAL at 18:57

## 2020-08-03 RX ADMIN — ACETAMINOPHEN 650 MG: 325 TABLET ORAL at 07:56

## 2020-08-03 RX ADMIN — IBUPROFEN 800 MG: 400 TABLET, FILM COATED ORAL at 22:41

## 2020-08-03 RX ADMIN — IBUPROFEN 800 MG: 400 TABLET, FILM COATED ORAL at 04:41

## 2020-08-03 RX ADMIN — DOCUSATE SODIUM 100 MG: 100 CAPSULE, LIQUID FILLED ORAL at 22:41

## 2020-08-03 RX ADMIN — IBUPROFEN 800 MG: 400 TABLET, FILM COATED ORAL at 14:41

## 2020-08-03 ASSESSMENT — PAIN DESCRIPTION - LOCATION
LOCATION: PERINEUM

## 2020-08-03 ASSESSMENT — PAIN SCALES - GENERAL
PAINLEVEL_OUTOF10: 3
PAINLEVEL_OUTOF10: 1
PAINLEVEL_OUTOF10: 3
PAINLEVEL_OUTOF10: 3
PAINLEVEL_OUTOF10: 0
PAINLEVEL_OUTOF10: 4
PAINLEVEL_OUTOF10: 3
PAINLEVEL_OUTOF10: 1

## 2020-08-03 ASSESSMENT — PAIN DESCRIPTION - PAIN TYPE
TYPE: ACUTE PAIN

## 2020-08-03 ASSESSMENT — PAIN DESCRIPTION - PROGRESSION
CLINICAL_PROGRESSION: GRADUALLY IMPROVING
CLINICAL_PROGRESSION: GRADUALLY IMPROVING
CLINICAL_PROGRESSION: GRADUALLY WORSENING
CLINICAL_PROGRESSION: GRADUALLY WORSENING
CLINICAL_PROGRESSION: RESOLVED

## 2020-08-03 ASSESSMENT — PAIN DESCRIPTION - ONSET
ONSET: ON-GOING

## 2020-08-03 ASSESSMENT — PAIN DESCRIPTION - DESCRIPTORS
DESCRIPTORS: SORE

## 2020-08-03 ASSESSMENT — PAIN DESCRIPTION - ORIENTATION
ORIENTATION: MID

## 2020-08-03 ASSESSMENT — PAIN - FUNCTIONAL ASSESSMENT
PAIN_FUNCTIONAL_ASSESSMENT: ACTIVITIES ARE NOT PREVENTED

## 2020-08-03 ASSESSMENT — PAIN DESCRIPTION - FREQUENCY
FREQUENCY: INTERMITTENT
FREQUENCY: CONTINUOUS
FREQUENCY: INTERMITTENT
FREQUENCY: CONTINUOUS
FREQUENCY: INTERMITTENT

## 2020-08-03 NOTE — FLOWSHEET NOTE
patient prefers that I come back to perform evening assessment. Will come later to do as patient is resting now.

## 2020-08-03 NOTE — ANESTHESIA POSTPROCEDURE EVALUATION
Department of Anesthesiology  Postprocedure Note    Patient: Bianka Claudio  MRN: [id number]  YOB: 1985  Date of evaluation: 8/3/2020  Time:  9:05 AM     Procedure Summary     Date:  08/02/20 Room / Location:      Anesthesia Start:  3872 Anesthesia Stop:  2014    Procedure:  Labor Analgesia Diagnosis:      Scheduled Providers:   Responsible Provider:  Rocio Munoz MD    Anesthesia Type:  epidural ASA Status:  1          Anesthesia Type: epidural    Ninoska Phase I: Ninoska Score: 10    Ninoska Phase II: Ninoska Score: 10    Last vitals: Reviewed and per EMR flowsheets.        Anesthesia Post Evaluation    Patient location during evaluation: floor  Patient participation: complete - patient participated  Level of consciousness: awake and alert  Pain score: 2  Airway patency: patent  Nausea & Vomiting: no vomiting and no nausea  Complications: no  Cardiovascular status: hemodynamically stable  Respiratory status: room air, spontaneous ventilation and acceptable  Hydration status: stable

## 2020-08-03 NOTE — ANESTHESIA POSTPROCEDURE EVALUATION
Department of Anesthesiology  Postprocedure Note    Patient: Arun Negron  MRN: [id number]  YOB: 1985  Date of evaluation: 8/3/2020  Time:  3:53 AM     Procedure Summary     Date:  08/02/20 Room / Location:      Anesthesia Start:  2421 Anesthesia Stop:  2014    Procedure:  Labor Analgesia Diagnosis:      Scheduled Providers:   Responsible Provider:  Tiffany Felder MD    Anesthesia Type:  epidural ASA Status:  1          Anesthesia Type: epidural    Ninoska Phase I: Ninoska Score: 10    Ninoska Phase II: Ninoska Score: 10    Last vitals: Reviewed and per EMR flowsheets.        Anesthesia Post Evaluation    Patient location during evaluation: PACU  Patient participation: complete - patient participated  Level of consciousness: awake and alert  Pain score: 0  Airway patency: patent  Nausea & Vomiting: no vomiting and no nausea  Complications: no  Cardiovascular status: blood pressure returned to baseline and hemodynamically stable  Respiratory status: acceptable and room air  Hydration status: stable

## 2020-08-03 NOTE — PLAN OF CARE
Problem: Pain:  Description: Pain management should include both nonpharmacologic and pharmacologic interventions.   Goal: Pain level will decrease  Description: Pain level will decrease  Outcome: Ongoing  Goal: Control of acute pain  Description: Control of acute pain  Outcome: Ongoing  Goal: Control of chronic pain  Description: Control of chronic pain  Outcome: Ongoing     Problem: Anxiety:  Goal: Level of anxiety will decrease  Description: Level of anxiety will decrease  Outcome: Ongoing     Problem: Breathing Pattern - Ineffective:  Goal: Able to breathe comfortably  Description: Able to breathe comfortably  Outcome: Ongoing     Problem: Fluid Volume - Imbalance:  Goal: Absence of imbalanced fluid volume signs and symptoms  Description: Absence of imbalanced fluid volume signs and symptoms  Outcome: Ongoing  Goal: Absence of intrapartum hemorrhage signs and symptoms  Description: Absence of intrapartum hemorrhage signs and symptoms  Outcome: Ongoing  Goal: Absence of postpartum hemorrhage signs and symptoms  Description: Absence of postpartum hemorrhage signs and symptoms  8/3/2020 0834 by Ping Gordon RN  Outcome: Ongoing  8/3/2020 0631 by Myron العراقي RN  Outcome: Ongoing     Problem: Infection - Intrapartum Infection:  Goal: Will show no infection signs and symptoms  Description: Will show no infection signs and symptoms  8/3/2020 0834 by Ping Gordon RN  Outcome: Ongoing  8/3/2020 0631 by Myron العراقي RN  Outcome: Ongoing     Problem: Labor Process - Prolonged:  Goal: Labor progression, first stage, within specified pattern  Description: Labor progression, first stage, within specified pattern  Outcome: Ongoing  Goal: Labor progession, second stage, within specified pattern  Description: Labor progession, second stage, within specified pattern  Outcome: Ongoing  Goal: Uterine contractions within specified parameters  Description: Uterine contractions within specified parameters  Outcome: Ongoing     Problem: Mood - Altered:  Goal: Mood stable  Description: Mood stable  8/3/2020 0834 by Kylah Natarajan RN  Outcome: Ongoing  8/3/2020 0631 by Crystal Prado RN  Outcome: Ongoing

## 2020-08-03 NOTE — FLOWSHEET NOTE
Pt assisted OOB with 1 assist to bathroom. Pt unable to void. Lucia instruction given and demonstrated. Pt verbalized understanding of instructions. Epidural removed with tip intact at this time. IV saline locked at this time. Mother assisted to wheelchair and infant placed in mothers arms. Both transferred to [facility] room 2259. Pt oriented to new room, infant bassinet,  care. Call light in reach. Patient instructed to call this RN if she feels the need to void. Will continue to monitor. English

## 2020-08-03 NOTE — DISCHARGE SUMMARY
Department of Obstetrics and Gynecology  Postpartum Discharge Summary      Admit Date: 2020    Admit Diagnosis: Normal labor @ 44 0/7 w   Discharge Date: 2020    Discharge Diagnoses: spontaneous vaginal delivery    There are no discharge medications for this patient. Service: Obstetrics    Consults: none    Significant Diagnostic Studies: none    Postpartum complications: none     Condition at Discharge: good    Hospital Course: uncomplicated    Discharge Instructions: Activity: as tolerated    Diet: regular diet    Instructions: No intercourse and nothing in the vagina for 6 weeks. Do not drive while using pain medications.  Keep any wounds clean and dry    Discharge to: Home    Disposition / Follow up: Return to office in 6 weeks    Home Health Nurse visit within 24-48 h if qualifies    [place] Data:  Weight   Information for the patient's :  Rasta Green [[id number]]        [address] for the patient's :  Rasta Norma [[id number]]         Home with mother    Electronically signed by Saida Jerome MD on 8/3/2020 at 12:10 PM

## 2020-08-03 NOTE — FLOWSHEET NOTE
of viable male infant at this time. Infant placed on mom's abdomen, dried and stimulated with spontaneous lusty cry. Cord clamped and cut, delayed cord clamping per Dr Joseph Moise. Infant placed skin to skin with mom. Warm blanket, hat, and diaper applied, Apgars 9/9. See delivery summary and  delivery details.

## 2020-08-03 NOTE — FLOWSHEET NOTE
Mom has flat nipples and has been using shells for past 8 weeks. Unable to help Mom express colostrum. Encouraged to continue to attempt breast feeding every 2 hours. Discussed use of nipple shield and pump - Mom will ing to use whatever devices necessary to breast feed infant. Mom also aware that Lactation will be in to assess and help her during the day today.

## 2020-08-03 NOTE — L&D DELIVERY NOTE
Department of Obstetrics and Gynecology  Spontaneous Vaginal Delivery Note      Eryn Cunningham NNYUVRU,[id number]    PRENATAL CARE: Complicated by: AMA, h/o LEEP    Pre-operative Diagnosis:  early labor and SROM      Post-operative Diagnosis: The same    Additional Procedures: uterine exploration and perineal laceration repair     Information for the patient's :   [[id number]]                    Infant Wt:   Information for the patient's :   [[id number]]            APGARS:     Information for the patient's :   [[id number]]           Anesthesia:  epidural anesthesia    Specimen:  Placenta sent to pathology No    Estimated blood loss: 300 cc     Condition: mother and infant stable in the L&D room    Infant Feeding: breast    Delivery Summary: Patient is Nina Trivedi is a 28 y.o.  female at 39w0d admitted to Labor and Delivery room and placed on external monitors. Contractions were regular, FHT was reactive with moderate variability without decels. Patient did received epidural anesthesia. Labor progressed as anticipated to fully dilated cervix. With subsequent contractions she started pushing and delivered vaginally spontaneously with no complications. The right hand was delivered near the infant's shoulder. 10 Units of Pitocin in 500 ml of LR was started IV. Placenta, cord and membranes were delivered spontaneously within less than 15 minutes. Uterus was explored. The cervix, vagina and perineum were examined and second degree perineal laceration was repaired in the regular fashion with Vicryl. Uterus was well contracted. Vaginal sweep was done, laps count was correct. Mother and  left stable to recovery. Viable male infant, Apgars 9/9, weight pending skin-to-skin.       Electronically signed by Vanessa Kidd MD on 2020 at 8:36 PM

## 2020-08-03 NOTE — FLOWSHEET NOTE
MOrning assessment complete. Pt reports pain well controlled on oral meds - mostly just soreness in perineum and hemorrhoids. Pt provided fresh ice packs and has been using silvio bottle and tucks pads for comfort/care. Abd soft and fundus firm u/u. Pt reports scant/small bleeding and no clots.  Pt denies needs at this time and planning to order breakfast.

## 2020-08-03 NOTE — FLOWSHEET NOTE
Patient ambulate to bathroom. Patient able to void 250ml blood tinged urine. Patient performed pericare on self. Patient ambulated back to bed without difficulty. No needs verbalized at this time.

## 2020-08-03 NOTE — PLAN OF CARE
Problem: Fluid Volume - Imbalance:  Goal: Absence of postpartum hemorrhage signs and symptoms  Description: Absence of postpartum hemorrhage signs and symptoms  Outcome: Ongoing     Problem: Infection - Intrapartum Infection:  Goal: Will show no infection signs and symptoms  Description: Will show no infection signs and symptoms  Outcome: Ongoing     Problem: Discharge Planning:  Goal: Discharged to appropriate level of care  Description: Discharged to appropriate level of care  Outcome: Ongoing     Problem: Constipation:  Goal: Bowel elimination is within specified parameters  Description: Bowel elimination is within specified parameters  Outcome: Ongoing     Problem: Infection - Risk of, Puerperal Infection:  Goal: Will show no infection signs and symptoms  Description: Will show no infection signs and symptoms  Outcome: Ongoing     Problem: Mood - Altered:  Goal: Mood stable  Description: Mood stable  Outcome: Ongoing

## 2020-08-03 NOTE — FLOWSHEET NOTE
Patient's desires up to BR. Patient assisted up x 1 assistant and ambulated without difficulty to BR. Patient unable to void. Patient performed pericare on self. Patient assisted back to bed and call light within reach. Patient instructed that she has to void by 0200 or this RN will straight cath to empty bladder. Patient instructed to call for assistance with next void. Patient verbalized understanding.

## 2020-08-04 VITALS
WEIGHT: 191.2 LBS | DIASTOLIC BLOOD PRESSURE: 74 MMHG | HEIGHT: 68 IN | TEMPERATURE: 97.9 F | BODY MASS INDEX: 28.98 KG/M2 | OXYGEN SATURATION: 96 % | HEART RATE: 78 BPM | RESPIRATION RATE: 16 BRPM | SYSTOLIC BLOOD PRESSURE: 104 MMHG

## 2020-08-04 PROCEDURE — 6370000000 HC RX 637 (ALT 250 FOR IP): Performed by: OBSTETRICS & GYNECOLOGY

## 2020-08-04 RX ADMIN — DOCUSATE SODIUM 100 MG: 100 CAPSULE, LIQUID FILLED ORAL at 09:55

## 2020-08-04 RX ADMIN — ACETAMINOPHEN 650 MG: 325 TABLET ORAL at 02:57

## 2020-08-04 RX ADMIN — IBUPROFEN 800 MG: 400 TABLET, FILM COATED ORAL at 12:13

## 2020-08-04 ASSESSMENT — PAIN SCALES - GENERAL
PAINLEVEL_OUTOF10: 1
PAINLEVEL_OUTOF10: 1

## 2020-08-04 ASSESSMENT — PAIN DESCRIPTION - DESCRIPTORS
DESCRIPTORS: SORE
DESCRIPTORS: SORE

## 2020-08-04 NOTE — LACTATION NOTE
This note was copied from a baby's chart. LC to room. Attempted breastfeeding with use of nipple shield and SNS at this time. Infant falls asleep when put to breast, but when upright taking bottle well with no issues/complications. LC discussed use of SNS, Nipple shield and pumping at this time. LC discussed use of formula, attempting breastfeeding first and offering any expressed breastmilk before formula. Mother agreed. Mother still unable to hand express or pump any colostrum at this time. LC encouraged mother to follow up with lactation services in 24-48 hours for additional resources/support as needed. Mother agreed and denies any further needs at this time.
This note was copied from a baby's chart. LC to room. LC delivered and set up a [employer] Symphony Breast pump. LC provided supplies necessary for pumping including wash basin, soap, bottle , oral syringes with caps and extra bottles. [facility] educated mother on assembly, use, cleaning, and milk storage guidelines. Encouraged mother to pump every 2-3 hours or at least 8 times in 24 hour period. LC observed mother pumping with size 27 mm flanges. Mother tolerating pumping well and verbalizes understanding of all information given. Mother encouraged mother to call in 30 minutes to help with cleaning of parts and go over feeding plan. Mother agreed. LC reviewed Protecting Breastfeeding Care Plan at this time, encouraged mother to always offer breast before pumping. Mother agreed and denies any further needs at this time.
This note was copied from a baby's chart. LC to room. Mother attempting to latch infant to right breast at this time. Infant will not suck with latch. LC discussed in length the temporary use of a nipple shield with mother. LC gave and reviewed Care Plan with mother. LC reviewed cleaning, application, and risks and benefits including possible impaired milk intake by infant and impaired milk production of mother. [facility] educated mother to use a breast pump after each feeding with nipple shield. LC stressed the importance of follow up with her pediatrician and Lactation. LC gave instructions and tips on weaning from nipple shield within 1-2 weeks. Infant would latch, but would not suck with use of nipple shield. LC discussed infant almost 24 hours, has not had a urine yet and mother is unable to hand express or pump breast milk for infant at this time. LC discussed options, including formula and use of donor breast milk. LC gave handout on donor milk and reviewed risks/benefits of using. Mother states she would like to use donor milk over formula at this time. LC reviewed feeding plan of attempting to breastfeed with/without use of nipple shield, offering any expressed milk of mother's, offer donor milk then pump. Mother agreed. LC brought in consent, RN and mother reviewed and signed. LC brought in 15 ml of donor human milk with slow flow nipple and gave education on bottle feeding infant. Infant was uncoordinated with bottle and took about 20 minutes to take 11 ml of the donor milk at this time. Infant handed to FOB to burp, and mother will pump at this time. Consent is placed in infant chart. No further needs noted at this time.
This note was copied from a baby's chart. LC to room. Mother done with pumping at this time, states she was unable to obtain anything from pumping at this time. LC reviewed use of pump for stimulation, will wait until infant wakes to attempt breastfeeding with use of hand expression and review a feeding plan at that time. Mother agreed and denies any further needs at this time.
maternal comfort. [facility] taught and mother returned demo of corner latch to improve latch. [facility] wrote name and circled the phone number on patient's whiteboard, provided a lactation consultant business card, directed mother to [facility] [employer]. com and other handouts for evidence based information, and encouraged mother to call for a feeding. Response: Mother verbalizes understanding of information given and denies further needs at this time. Mother states will call for next feeding.

## 2020-08-04 NOTE — FLOWSHEET NOTE

## 2020-08-04 NOTE — CARE COORDINATION
LSW met with patient/MOB in room to discuss discharge planning and home health visit. Patient/MOB states that she has all items needed for infant care including, car seat, crib, diapers, and bottles. Patient plans to use [facility] [address] [place] [address] for infant care. At this time patient does not indicate any other needs. LSW available if discharge needs arise.   Electronically signed by Angela Mays on 8/4/2020 at 11:36 AM

## 2021-05-27 LAB
HPV COMMENT: NORMAL
HPV TYPE 16: NOT DETECTED
HPV TYPE 18: NOT DETECTED
HPVOH (OTHER TYPES): NOT DETECTED

## 2021-06-08 ENCOUNTER — PATIENT MESSAGE (OUTPATIENT)
Dept: FAMILY MEDICINE CLINIC | Age: 36
End: 2021-06-08

## 2021-06-08 NOTE — TELEPHONE ENCOUNTER
From: Beltran Reed  To: Mahesh Cardenas MD  Sent: 6/8/2021 9:38 AM EDT  Subject: Non-Urgent Napoleon Caruso,    I have been dealing with some added stress this past year (who hasn't, right?). I've been trying some different techniques - making sure I get exercise, meditation, etc. Obviously with a new baby I haven't been as regimented with these practices but still making an effort. A patient of mine told me about ashwagandha yesterday so I was researching and I'm kind of interested in taking it. Not sure if discussing adding a supplement is worth taking up an appointment time or not - but I'm happy to come in if you think that is best. Just wanted to ask your thoughts and if there are any concerns. The only one I could find was if I was pregnant or trying to get pregnant again. Thanks so much!

## 2021-06-15 LAB
CHOLESTEROL, TOTAL: 170 MG/DL (ref 0–199)
GLUCOSE BLD-MCNC: 104 MG/DL (ref 70–99)
HDLC SERPL-MCNC: 48 MG/DL (ref 40–60)
LDL CHOLESTEROL CALCULATED: 107 MG/DL
TRIGL SERPL-MCNC: 73 MG/DL (ref 0–150)

## 2022-06-23 LAB
BASOPHILS ABSOLUTE: 0 K/UL (ref 0–0.2)
BASOPHILS RELATIVE PERCENT: 1 %
EOSINOPHILS ABSOLUTE: 0.2 K/UL (ref 0–0.6)
EOSINOPHILS RELATIVE PERCENT: 3.9 %
HCT VFR BLD CALC: 37.7 % (ref 36–48)
HEMOGLOBIN: 13.1 G/DL (ref 12–16)
LYMPHOCYTES ABSOLUTE: 2.1 K/UL (ref 1–5.1)
LYMPHOCYTES RELATIVE PERCENT: 42.9 %
MCH RBC QN AUTO: 32.3 PG (ref 26–34)
MCHC RBC AUTO-ENTMCNC: 34.8 G/DL (ref 31–36)
MCV RBC AUTO: 92.8 FL (ref 80–100)
MONOCYTES ABSOLUTE: 0.4 K/UL (ref 0–1.3)
MONOCYTES RELATIVE PERCENT: 8 %
NEUTROPHILS ABSOLUTE: 2.1 K/UL (ref 1.7–7.7)
NEUTROPHILS RELATIVE PERCENT: 44.2 %
PDW BLD-RTO: 13.2 % (ref 12.4–15.4)
PLATELET # BLD: 243 K/UL (ref 135–450)
PMV BLD AUTO: 8.1 FL (ref 5–10.5)
PROGESTERONE LEVEL: 4.52 NG/ML
PROLACTIN: 7.6 NG/ML
RBC # BLD: 4.06 M/UL (ref 4–5.2)
TSH REFLEX FT4: 3.54 UIU/ML (ref 0.27–4.2)
WBC # BLD: 4.8 K/UL (ref 4–11)

## 2022-06-26 LAB — ANTI MULLERIAN HORMONE: 1.68 NG/ML (ref 0.18–11.71)

## 2022-06-27 LAB
CHOLESTEROL, TOTAL: 171 MG/DL (ref 0–199)
GLUCOSE BLD-MCNC: 104 MG/DL (ref 70–99)
HDLC SERPL-MCNC: 49 MG/DL (ref 40–60)
LDL CHOLESTEROL CALCULATED: 108 MG/DL
TRIGL SERPL-MCNC: 69 MG/DL (ref 0–150)

## 2022-07-14 NOTE — TELEPHONE ENCOUNTER
Dr. Annia Coleman, patient interested in free prenatal vitamins and iron per response from [address] [facility]. Order for [facility] Baby Box pending for your convenience. Thank you,  Deshawn Gould, PharmD  [address] [facility]  Department, toll free: [phone]  [address]      =======================================================================    [place] REVIEW - Be Well With Baby    SUBJECTIVE  Patience Bump is a 40 y.o. female currently identified as interested in receiving a [facility] \"Baby Box\" containing a 1 year supply of prenatal vitamins from [address] [facility]. Contents of Baby Box:   Welcome Letter   6 month supply of Nature's Blend Prenatal Multivitamin with Minerals (Take 1 softgel once daily) with 1 refill    Thank you  Mery Gould, PharmD  [address] [facility]  Department, toll free: [phone]  [address]

## 2022-08-02 LAB
AMPHETAMINE SCREEN, URINE: NORMAL
BARBITURATE SCREEN URINE: NORMAL
BENZODIAZEPINE SCREEN, URINE: NORMAL
CANNABINOID SCREEN URINE: NORMAL
COCAINE METABOLITE SCREEN URINE: NORMAL
Lab: NORMAL
METHADONE SCREEN, URINE: NORMAL
OPIATE SCREEN URINE: NORMAL
OXYCODONE URINE: NORMAL
PH UA: 6.5
PHENCYCLIDINE SCREEN URINE: NORMAL
PROPOXYPHENE SCREEN: NORMAL

## 2022-08-03 LAB — URINE CULTURE, ROUTINE: NORMAL

## 2022-08-05 LAB
C TRACH DNA GENITAL QL NAA+PROBE: NEGATIVE
N. GONORRHOEAE DNA: NEGATIVE

## 2022-08-22 LAB
ABO/RH: NORMAL
ANTIBODY SCREEN: NORMAL
BASOPHILS ABSOLUTE: 0 K/UL (ref 0–0.2)
BASOPHILS RELATIVE PERCENT: 0.6 %
EOSINOPHILS ABSOLUTE: 0.1 K/UL (ref 0–0.6)
EOSINOPHILS RELATIVE PERCENT: 1.1 %
GONADOTROPIN, CHORIONIC (HCG) QUANT: 5151 MIU/ML
HCG QUALITATIVE: POSITIVE
HCT VFR BLD CALC: 39.2 % (ref 36–48)
HEMOGLOBIN: 13.7 G/DL (ref 12–16)
LYMPHOCYTES ABSOLUTE: 1.8 K/UL (ref 1–5.1)
LYMPHOCYTES RELATIVE PERCENT: 27.6 %
MCH RBC QN AUTO: 32.5 PG (ref 26–34)
MCHC RBC AUTO-ENTMCNC: 35 G/DL (ref 31–36)
MCV RBC AUTO: 92.8 FL (ref 80–100)
MONOCYTES ABSOLUTE: 0.4 K/UL (ref 0–1.3)
MONOCYTES RELATIVE PERCENT: 6.1 %
NEUTROPHILS ABSOLUTE: 4.2 K/UL (ref 1.7–7.7)
NEUTROPHILS RELATIVE PERCENT: 64.6 %
PDW BLD-RTO: 12.6 % (ref 12.4–15.4)
PLATELET # BLD: 238 K/UL (ref 135–450)
PMV BLD AUTO: 8.7 FL (ref 5–10.5)
PROGESTERONE LEVEL: 2.47 NG/ML
RBC # BLD: 4.22 M/UL (ref 4–5.2)
WBC # BLD: 6.5 K/UL (ref 4–11)

## 2022-08-24 ENCOUNTER — ANESTHESIA (OUTPATIENT)
Dept: LABOR AND DELIVERY | Age: 37
End: 2022-08-24
Payer: COMMERCIAL

## 2022-08-24 ENCOUNTER — ANESTHESIA EVENT (OUTPATIENT)
Dept: LABOR AND DELIVERY | Age: 37
End: 2022-08-24
Payer: COMMERCIAL

## 2022-08-24 ENCOUNTER — HOSPITAL ENCOUNTER (OUTPATIENT)
Age: 37
Setting detail: OBSERVATION
Discharge: HOME | End: 2022-08-24
Attending: OBSTETRICS & GYNECOLOGY | Admitting: OBSTETRICS & GYNECOLOGY
Payer: COMMERCIAL

## 2022-08-24 PROBLEM — O02.0 MOLAR PREGNANCY: Status: ACTIVE | Noted: 2022-08-24

## 2022-08-24 LAB
ABO/RH: NORMAL
ANTIBODY SCREEN: NORMAL
BASOPHILS ABSOLUTE: 0 K/UL (ref 0–0.2)
BASOPHILS RELATIVE PERCENT: 0.3 %
EOSINOPHILS ABSOLUTE: 0.1 K/UL (ref 0–0.6)
EOSINOPHILS RELATIVE PERCENT: 1.4 %
HCT VFR BLD CALC: 33.5 % (ref 36–48)
HEMOGLOBIN: 12 G/DL (ref 12–16)
LYMPHOCYTES ABSOLUTE: 2.4 K/UL (ref 1–5.1)
LYMPHOCYTES RELATIVE PERCENT: 31.6 %
MCH RBC QN AUTO: 32.3 PG (ref 26–34)
MCHC RBC AUTO-ENTMCNC: 35.9 G/DL (ref 31–36)
MCV RBC AUTO: 90 FL (ref 80–100)
MONOCYTES ABSOLUTE: 0.4 K/UL (ref 0–1.3)
MONOCYTES RELATIVE PERCENT: 5.4 %
NEUTROPHILS ABSOLUTE: 4.6 K/UL (ref 1.7–7.7)
NEUTROPHILS RELATIVE PERCENT: 61.3 %
PDW BLD-RTO: 12.4 % (ref 12.4–15.4)
PLATELET # BLD: 223 K/UL (ref 135–450)
PMV BLD AUTO: 7.9 FL (ref 5–10.5)
RBC # BLD: 3.72 M/UL (ref 4–5.2)
WBC # BLD: 7.5 K/UL (ref 4–11)

## 2022-08-24 PROCEDURE — 6370000000 HC RX 637 (ALT 250 FOR IP): Performed by: OBSTETRICS & GYNECOLOGY

## 2022-08-24 PROCEDURE — 88305 TISSUE EXAM BY PATHOLOGIST: CPT

## 2022-08-24 PROCEDURE — 2580000003 HC RX 258: Performed by: ANESTHESIOLOGY

## 2022-08-24 PROCEDURE — 86923 COMPATIBILITY TEST ELECTRIC: CPT

## 2022-08-24 PROCEDURE — A4216 STERILE WATER/SALINE, 10 ML: HCPCS | Performed by: ANESTHESIOLOGY

## 2022-08-24 PROCEDURE — 2500000003 HC RX 250 WO HCPCS: Performed by: OBSTETRICS & GYNECOLOGY

## 2022-08-24 PROCEDURE — 3600000012 HC SURGERY LEVEL 2 ADDTL 15MIN: Performed by: OBSTETRICS & GYNECOLOGY

## 2022-08-24 PROCEDURE — 2580000003 HC RX 258: Performed by: OBSTETRICS & GYNECOLOGY

## 2022-08-24 PROCEDURE — 6360000002 HC RX W HCPCS: Performed by: NURSE ANESTHETIST, CERTIFIED REGISTERED

## 2022-08-24 PROCEDURE — 86900 BLOOD TYPING SEROLOGIC ABO: CPT

## 2022-08-24 PROCEDURE — 36415 COLL VENOUS BLD VENIPUNCTURE: CPT

## 2022-08-24 PROCEDURE — 7100000001 HC PACU RECOVERY - ADDTL 15 MIN: Performed by: OBSTETRICS & GYNECOLOGY

## 2022-08-24 PROCEDURE — 7100000000 HC PACU RECOVERY - FIRST 15 MIN: Performed by: OBSTETRICS & GYNECOLOGY

## 2022-08-24 PROCEDURE — 2500000003 HC RX 250 WO HCPCS: Performed by: NURSE ANESTHETIST, CERTIFIED REGISTERED

## 2022-08-24 PROCEDURE — 85025 COMPLETE CBC W/AUTO DIFF WBC: CPT

## 2022-08-24 PROCEDURE — 2709999900 HC NON-CHARGEABLE SUPPLY: Performed by: OBSTETRICS & GYNECOLOGY

## 2022-08-24 PROCEDURE — 86850 RBC ANTIBODY SCREEN: CPT

## 2022-08-24 PROCEDURE — 2500000003 HC RX 250 WO HCPCS: Performed by: ANESTHESIOLOGY

## 2022-08-24 PROCEDURE — 3700000001 HC ADD 15 MINUTES (ANESTHESIA)

## 2022-08-24 PROCEDURE — 3700000000 HC ANESTHESIA ATTENDED CARE

## 2022-08-24 PROCEDURE — 3700000001 HC ADD 15 MINUTES (ANESTHESIA): Performed by: OBSTETRICS & GYNECOLOGY

## 2022-08-24 PROCEDURE — G0378 HOSPITAL OBSERVATION PER HR: HCPCS

## 2022-08-24 PROCEDURE — 6370000000 HC RX 637 (ALT 250 FOR IP): Performed by: ANESTHESIOLOGY

## 2022-08-24 PROCEDURE — 6360000002 HC RX W HCPCS: Performed by: ANESTHESIOLOGY

## 2022-08-24 PROCEDURE — 3700000000 HC ANESTHESIA ATTENDED CARE: Performed by: OBSTETRICS & GYNECOLOGY

## 2022-08-24 PROCEDURE — 3600000002 HC SURGERY LEVEL 2 BASE: Performed by: OBSTETRICS & GYNECOLOGY

## 2022-08-24 PROCEDURE — 86901 BLOOD TYPING SEROLOGIC RH(D): CPT

## 2022-08-24 RX ORDER — ACETAMINOPHEN 325 MG/1
650 TABLET ORAL EVERY 4 HOURS PRN
Status: DISCONTINUED | OUTPATIENT
Start: 2022-08-24 | End: 2022-08-25 | Stop reason: HOSPADM

## 2022-08-24 RX ORDER — SUCCINYLCHOLINE/SOD CL,ISO/PF 200MG/10ML
SYRINGE (ML) INTRAVENOUS PRN
Status: DISCONTINUED | OUTPATIENT
Start: 2022-08-24 | End: 2022-08-24 | Stop reason: SDUPTHER

## 2022-08-24 RX ORDER — TRISODIUM CITRATE DIHYDRATE AND CITRIC ACID MONOHYDRATE 500; 334 MG/5ML; MG/5ML
30 SOLUTION ORAL ONCE
Status: COMPLETED | OUTPATIENT
Start: 2022-08-24 | End: 2022-08-24

## 2022-08-24 RX ORDER — MIDAZOLAM HYDROCHLORIDE 1 MG/ML
INJECTION INTRAMUSCULAR; INTRAVENOUS PRN
Status: DISCONTINUED | OUTPATIENT
Start: 2022-08-24 | End: 2022-08-24 | Stop reason: SDUPTHER

## 2022-08-24 RX ORDER — ONDANSETRON 2 MG/ML
INJECTION INTRAMUSCULAR; INTRAVENOUS PRN
Status: DISCONTINUED | OUTPATIENT
Start: 2022-08-24 | End: 2022-08-24 | Stop reason: SDUPTHER

## 2022-08-24 RX ORDER — OXYTOCIN 10 [USP'U]/ML
INJECTION, SOLUTION INTRAMUSCULAR; INTRAVENOUS PRN
Status: DISCONTINUED | OUTPATIENT
Start: 2022-08-24 | End: 2022-08-24 | Stop reason: SDUPTHER

## 2022-08-24 RX ORDER — KETOROLAC TROMETHAMINE 30 MG/ML
INJECTION, SOLUTION INTRAMUSCULAR; INTRAVENOUS PRN
Status: DISCONTINUED | OUTPATIENT
Start: 2022-08-24 | End: 2022-08-24 | Stop reason: SDUPTHER

## 2022-08-24 RX ORDER — LIDOCAINE HYDROCHLORIDE 20 MG/ML
INJECTION, SOLUTION EPIDURAL; INFILTRATION; INTRACAUDAL; PERINEURAL PRN
Status: DISCONTINUED | OUTPATIENT
Start: 2022-08-24 | End: 2022-08-24 | Stop reason: SDUPTHER

## 2022-08-24 RX ORDER — DEXAMETHASONE SODIUM PHOSPHATE 4 MG/ML
INJECTION, SOLUTION INTRA-ARTICULAR; INTRALESIONAL; INTRAMUSCULAR; INTRAVENOUS; SOFT TISSUE PRN
Status: DISCONTINUED | OUTPATIENT
Start: 2022-08-24 | End: 2022-08-24 | Stop reason: SDUPTHER

## 2022-08-24 RX ORDER — SODIUM CHLORIDE, SODIUM LACTATE, POTASSIUM CHLORIDE, CALCIUM CHLORIDE 600; 310; 30; 20 MG/100ML; MG/100ML; MG/100ML; MG/100ML
INJECTION, SOLUTION INTRAVENOUS CONTINUOUS
Status: DISCONTINUED | OUTPATIENT
Start: 2022-08-24 | End: 2022-08-25 | Stop reason: HOSPADM

## 2022-08-24 RX ORDER — ONDANSETRON 2 MG/ML
4 INJECTION INTRAMUSCULAR; INTRAVENOUS EVERY 6 HOURS PRN
Status: DISCONTINUED | OUTPATIENT
Start: 2022-08-24 | End: 2022-08-25 | Stop reason: HOSPADM

## 2022-08-24 RX ORDER — ONDANSETRON 4 MG/1
4 TABLET, ORALLY DISINTEGRATING ORAL EVERY 8 HOURS PRN
Status: DISCONTINUED | OUTPATIENT
Start: 2022-08-24 | End: 2022-08-25 | Stop reason: HOSPADM

## 2022-08-24 RX ORDER — SODIUM CHLORIDE 0.9 % (FLUSH) 0.9 %
5-40 SYRINGE (ML) INJECTION PRN
Status: DISCONTINUED | OUTPATIENT
Start: 2022-08-24 | End: 2022-08-25 | Stop reason: HOSPADM

## 2022-08-24 RX ORDER — SODIUM CHLORIDE 9 MG/ML
INJECTION, SOLUTION INTRAVENOUS PRN
Status: DISCONTINUED | OUTPATIENT
Start: 2022-08-24 | End: 2022-08-25 | Stop reason: HOSPADM

## 2022-08-24 RX ORDER — FENTANYL CITRATE 50 UG/ML
INJECTION, SOLUTION INTRAMUSCULAR; INTRAVENOUS PRN
Status: DISCONTINUED | OUTPATIENT
Start: 2022-08-24 | End: 2022-08-24 | Stop reason: SDUPTHER

## 2022-08-24 RX ORDER — METOCLOPRAMIDE HYDROCHLORIDE 5 MG/ML
10 INJECTION INTRAMUSCULAR; INTRAVENOUS ONCE
Status: COMPLETED | OUTPATIENT
Start: 2022-08-24 | End: 2022-08-24

## 2022-08-24 RX ORDER — PHENYLEPHRINE HYDROCHLORIDE 10 MG/ML
INJECTION INTRAVENOUS PRN
Status: DISCONTINUED | OUTPATIENT
Start: 2022-08-24 | End: 2022-08-24 | Stop reason: SDUPTHER

## 2022-08-24 RX ORDER — SODIUM CHLORIDE 0.9 % (FLUSH) 0.9 %
5-40 SYRINGE (ML) INJECTION EVERY 12 HOURS SCHEDULED
Status: DISCONTINUED | OUTPATIENT
Start: 2022-08-24 | End: 2022-08-25 | Stop reason: HOSPADM

## 2022-08-24 RX ORDER — ROCURONIUM BROMIDE 10 MG/ML
INJECTION, SOLUTION INTRAVENOUS PRN
Status: DISCONTINUED | OUTPATIENT
Start: 2022-08-24 | End: 2022-08-24 | Stop reason: SDUPTHER

## 2022-08-24 RX ADMIN — SODIUM CHLORIDE, POTASSIUM CHLORIDE, SODIUM LACTATE AND CALCIUM CHLORIDE: 600; 310; 30; 20 INJECTION, SOLUTION INTRAVENOUS at 20:45

## 2022-08-24 RX ADMIN — LIDOCAINE HYDROCHLORIDE 100 MG: 20 INJECTION, SOLUTION EPIDURAL; INFILTRATION; INTRACAUDAL; PERINEURAL at 21:13

## 2022-08-24 RX ADMIN — MIDAZOLAM 1 MG: 1 INJECTION INTRAMUSCULAR; INTRAVENOUS at 21:13

## 2022-08-24 RX ADMIN — METOCLOPRAMIDE 10 MG: 5 INJECTION, SOLUTION INTRAMUSCULAR; INTRAVENOUS at 21:02

## 2022-08-24 RX ADMIN — PHENYLEPHRINE HYDROCHLORIDE 100 MCG: 10 INJECTION INTRAVENOUS at 21:18

## 2022-08-24 RX ADMIN — DEXAMETHASONE SODIUM PHOSPHATE 8 MG: 4 INJECTION, SOLUTION INTRAMUSCULAR; INTRAVENOUS at 21:16

## 2022-08-24 RX ADMIN — OXYTOCIN 10 UNITS: 10 INJECTION, SOLUTION INTRAMUSCULAR; INTRAVENOUS at 21:19

## 2022-08-24 RX ADMIN — KETOROLAC TROMETHAMINE 30 MG: 30 INJECTION, SOLUTION INTRAMUSCULAR at 21:16

## 2022-08-24 RX ADMIN — Medication 100 MG: at 21:13

## 2022-08-24 RX ADMIN — OXYTOCIN 20 UNITS: 10 INJECTION, SOLUTION INTRAMUSCULAR; INTRAVENOUS at 21:27

## 2022-08-24 RX ADMIN — ROCURONIUM BROMIDE 5 MG: 10 SOLUTION INTRAVENOUS at 21:13

## 2022-08-24 RX ADMIN — DOXYCYCLINE 100 MG: 100 INJECTION, POWDER, LYOPHILIZED, FOR SOLUTION INTRAVENOUS at 21:05

## 2022-08-24 RX ADMIN — FAMOTIDINE 40 MG: 10 INJECTION INTRAVENOUS at 20:59

## 2022-08-24 RX ADMIN — SODIUM CHLORIDE, POTASSIUM CHLORIDE, SODIUM LACTATE AND CALCIUM CHLORIDE: 600; 310; 30; 20 INJECTION, SOLUTION INTRAVENOUS at 21:27

## 2022-08-24 RX ADMIN — SODIUM CHLORIDE, POTASSIUM CHLORIDE, SODIUM LACTATE AND CALCIUM CHLORIDE: 600; 310; 30; 20 INJECTION, SOLUTION INTRAVENOUS at 21:07

## 2022-08-24 RX ADMIN — SODIUM CITRATE AND CITRIC ACID MONOHYDRATE 30 ML: 500; 334 SOLUTION ORAL at 20:59

## 2022-08-24 RX ADMIN — FENTANYL CITRATE 50 MCG: 50 INJECTION INTRAMUSCULAR; INTRAVENOUS at 21:13

## 2022-08-24 RX ADMIN — Medication 1 LOZENGE: at 23:28

## 2022-08-24 RX ADMIN — PHENYLEPHRINE HYDROCHLORIDE 100 MCG: 10 INJECTION INTRAVENOUS at 21:23

## 2022-08-24 RX ADMIN — PHENYLEPHRINE HYDROCHLORIDE 100 MCG: 10 INJECTION INTRAVENOUS at 21:30

## 2022-08-24 RX ADMIN — FENTANYL CITRATE 50 MCG: 50 INJECTION INTRAMUSCULAR; INTRAVENOUS at 21:07

## 2022-08-24 RX ADMIN — MIDAZOLAM 1 MG: 1 INJECTION INTRAMUSCULAR; INTRAVENOUS at 21:07

## 2022-08-24 RX ADMIN — ONDANSETRON 4 MG: 2 INJECTION INTRAMUSCULAR; INTRAVENOUS at 21:16

## 2022-08-25 VITALS
DIASTOLIC BLOOD PRESSURE: 67 MMHG | WEIGHT: 163 LBS | BODY MASS INDEX: 25.58 KG/M2 | SYSTOLIC BLOOD PRESSURE: 101 MMHG | OXYGEN SATURATION: 100 % | HEIGHT: 67 IN | HEART RATE: 76 BPM | RESPIRATION RATE: 16 BRPM | TEMPERATURE: 97 F

## 2022-08-25 LAB
BLOOD BANK DISPENSE STATUS: NORMAL
BLOOD BANK DISPENSE STATUS: NORMAL
BLOOD BANK PRODUCT CODE: NORMAL
BLOOD BANK PRODUCT CODE: NORMAL
BPU ID: NORMAL
BPU ID: NORMAL
DESCRIPTION BLOOD BANK: NORMAL
DESCRIPTION BLOOD BANK: NORMAL

## 2022-08-25 NOTE — ANESTHESIA POSTPROCEDURE EVALUATION
Department of Anesthesiology  Postprocedure Note    Patient: Yves Lewis  MRN: [id number]  YOB: 1985  Date of evaluation: 8/24/2022      Procedure Summary     Date: 08/24/22 Room / Location:     Anesthesia Start: 2107 Anesthesia Stop: 2143    Procedure: D&C Diagnosis:     Scheduled Providers:  Responsible Provider: Jose Munson MD    Anesthesia Type: general ASA Status: 2 - Emergent          Anesthesia Type: No value filed.     Ninoska Phase I:      Ninoska Phase II:        Anesthesia Post Evaluation    Patient location during evaluation: PACU  Patient participation: complete - patient participated  Level of consciousness: lethargic and responsive to verbal stimuli  Pain score: 2  Airway patency: patent  Nausea & Vomiting: no vomiting and no nausea  Complications: no  Cardiovascular status: hemodynamically stable  Respiratory status: spontaneous ventilation, acceptable and nasal cannula  Hydration status: stable

## 2022-08-25 NOTE — OP NOTE
Operative Note      Patient: Roni Sanders  YOB: 1985  MRN: [id number]    Date of Procedure:     Pre-Op Diagnosis: 10 weeks, suspected molar pregnancy    Post-Op Diagnosis: Same       Procedure: D&C with suction    Anesthesia: gen    Estimated Blood Loss (mL): 200 ml during the procedure    Complications: None    Specimens: POC    Findings: large amount of tissue passing through cervical os    Detailed Description of Procedure:   See full report    Electronically signed by Ilan Murrieta MD on 8/24/2022 at 9:42 PM

## 2022-08-25 NOTE — DISCHARGE INSTRUCTIONS
Follow up with your OB Provider ***           At this time you may feel overwhelmed. Please know that there are resources available to help you through these difficult days. For a referral to a support group or to answer other questions you may call the Bereavement Nurse, Yadira Kaminski [phone] or  the [facility] Care Department at [phone]. General Discharge Instructions      DIET  Eat a well balanced diet focusing on foods high in fiber and protein. Drink plenty of fluids especially water. To avoid constipation you may take a mild stool softener as recommended by your doctor or midwife. ACTIVITY  Gradually increase your activity. Resume exercise regimen only after advise by your doctor or midwife. Avoid lifting anything heavier than a gallon of milk until your follow up appointment. Rise slowly from a lying to sitting and then a standing position. Climb stairs one at a time. NO SEXUAL Activity for 4-6 weeks or until advised by your doctor; nothing in vagina: intercourse, tampons, or douching. You may feel tired or have a lack of energy. You may continue your prenatal vitamin to replenish nutrients. EMOTIONS  You may feel slaughter, sad & teary. Contact your OB provider if you feel you may be showing signs of postpartum depression. BLEEDING  Vaginal bleeding will decrease in amount over the next few weeks. You will notice that as your activity increases, your flow may increase. This is your body's way of telling you, you need take things easier and rest more often. Call your OB provider if you are saturating more than one maxi pad in an hour & resting does not help, if your bleeding has a foul odor or you are passing clots larger than a lemon. Use the silvio-bottle after toileting until bleeding stops. Cleanse your perineum from front to back. You may use a sitz bath or soak in a clean tub as needed for comfort. Kegel exercises will help restore bladder control. SWELLING  Try to keep your legs elevated when you are sitting. When lying down keep your legs elevated. When wearing stocking or socks, make sure they are not too tight. WHEN TO CALL THE DOCTOR  If you have a temp of 100.6 or more. If your bleeding has increased and you are saturating a pad in an hour. Your abdomen is tender to touch. You are passing blood clots bigger than the size of a lemon. If you are experiencing extreme weakness or dizziness. If you are having flu-like symptoms such as achy muscles or joints. There is a foul smell or a green color to your vaginal bleeding. If you have pain that cannot be relieved. You have persistent burning with urination or frequency. Call if you have concerns about your well-being. You are unable to sleep, eat, or are having thoughts of harming yourself. You have swelling, bleeding, drainage, fould odor, redness, or warmth in/around your incision or stitches. You have a red, warm, tender area in you calf. I have received the Discharge Instructions and have no further questions at this time. I understand the need for follow-up care with my [facility] Care Provider.

## 2022-08-25 NOTE — ANESTHESIA PRE PROCEDURE
Department of Anesthesiology  Preprocedure Note       Name:  Sherman Lay   Age:  40 y.o.  :  1985                                          MRN:  [id number]         Date:  2022      Surgeon: Dr. Maribell Leyva  Procedure: D&C  Medications prior to admission:   Prior to Admission medications    Medication Sig Start Date End Date Taking? Authorizing Provider   Misc. Devices KIT Nature's Blend Prenatal Multivitamin with Minerals [facility] Baby Box)  Stevie Aguayo 47: [address]-30; Take 1 softgel by mouth daily or as instructed by provider;  #qs for 6 months 22   Zunilda Robles MD       Current medications:    No current facility-administered medications for this encounter. Allergies: Allergies   Allergen Reactions    Sulfa Antibiotics Rash       Problem List:    Patient Active Problem List   Diagnosis Code    Closed nondisplaced fracture of distal phalanx of left little finger S62.667A    Normal labor O80, Z37.9       Past Medical History:        Diagnosis Date    Abnormal Pap smear of cervix     leep     MRSA (methicillin resistant staph aureus) culture positive     cultured in urine, done for frequent UTIs in grad school. Possible contaminant.        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 , LEEP    WISDOM TOOTH EXTRACTION         Social History:    Social History     Tobacco Use    Smoking status: Never    Smokeless tobacco: Never   Substance Use Topics    Alcohol use: Yes     Comment: 6 per wk                                Counseling given: Not Answered      Vital Signs (Current):   Vitals:    22   BP: 129/84   Pulse: 94   Resp: 18   Temp: 36.9 °C (98.4 °F)   TempSrc: Oral   SpO2: 98%   Weight: 163 lb (73.9 kg)   Height: 5' 7\" (1.702 m)                                              BP Readings from Last 3 Encounters:   22 129/84   20 104/74   18 122/70       NPO Status: BMI:   Wt Readings from Last 3 Encounters:   08/24/22 163 lb (73.9 kg)   08/02/20 191 lb 3.2 oz (86.7 kg)   07/02/19 177 lb 0.5 oz (80.3 kg)     Body mass index is 25.53 kg/m². CBC:   Lab Results   Component Value Date/Time    WBC 6.5 08/22/2022 11:10 AM    RBC 4.22 08/22/2022 11:10 AM    HGB 13.7 08/22/2022 11:10 AM    HCT 39.2 08/22/2022 11:10 AM    MCV 92.8 08/22/2022 11:10 AM    RDW 12.6 08/22/2022 11:10 AM     08/22/2022 11:10 AM       CMP:   Lab Results   Component Value Date/Time    GLUCOSE 104 06/27/2022 08:57 AM       POC Tests: No results for input(s): POCGLU, POCNA, POCK, POCCL, POCBUN, POCHEMO, POCHCT in the last 72 hours. Coags: No results found for: PROTIME, INR, APTT    HCG (If Applicable): No results found for: PREGTESTUR, PREGSERUM, HCG, HCGQUANT     ABGs: No results found for: PHART, PO2ART, VZM1QKG, DFE9MXN, BEART, M9NMACDK     Type & Screen (If Applicable):  No results found for: LABABO, LABRH    Drug/Infectious Status (If Applicable):  No results found for: HIV, HEPCAB    COVID-19 Screening (If Applicable):   Lab Results   Component Value Date/Time    COVID19 Not Detected 07/28/2020 11:10 AM           Anesthesia Evaluation  Patient summary reviewed and Nursing notes reviewed  Airway: Mallampati: II  TM distance: >3 FB   Neck ROM: full  Mouth opening: > = 3 FB   Dental: normal exam         Pulmonary:Negative Pulmonary ROS and normal exam              Patient did not smoke on day of surgery. Cardiovascular:Negative CV ROS             Beta Blocker:  Not on Beta Blocker         Neuro/Psych:   Negative Neuro/Psych ROS              GI/Hepatic/Renal: Neg GI/Hepatic/Renal ROS            Endo/Other: Negative Endo/Other ROS             Pt had no PAT visit       Abdominal:             Vascular: negative vascular ROS.          Other Findings:           Anesthesia Plan      general     ASA 2 - emergent Anesthetic plan and risks discussed with spouse and patient. Use of blood products discussed with patient and spouse whom consented to blood products. Plan discussed with CRNA. OB History        2    Para   1    Term   1            AB        Living   1       SAB        IAB        Ectopic        Molar        Multiple   0    Live Births   1          Obstetric Comments   Go             Mellissa Caballero is a 40y.o. year-old female admitted to Kali Zuniga MD for GEN. Gestational age is 6w6d. Her Body mass index is 25.53 kg/m². She was seen, examined and her chart was reviewed (including anesthesia, drug and allergy history). No interval changes are noted to her history and physical examination. (except as noted above).     Risks/benefits/alternatives of both neuraxial and general anesthesia were discussed and she agrees to proceed at the direction of the care team.    BLANCA Sterling - AXEL  2022  8:35 PM  BLANCA Sterling CRNA   2022

## 2022-08-25 NOTE — OP NOTE
OPERATIVE REPORT    Date of surgery: 08/24/2022  Pre-operative Diagnosis: 10 weeks, suspect molar pregnancy  Post-operative Diagnosis: the same  Procedure:  D&C with suction  Anesthesia: Regional and General  Surgeon: Loan El  Findings: POC  Complications: none  Specimens:  POC  Urine Output: 50 mL  Estimated blood loss: 200ml mL     INDICATIONS:  Patient is Debra Morris  is a 40 y.o. Eduardo Bernabe female   who was 10 weeks by LMP. She had an ultrasound on 8/22 revealing a suspected molar pregnancy. She had a BHCG of 5500 and Hb on 13.7 on 8/22. Today she present with heavy vaginal bleeding for the past several  hours . Her Hb is 12.3 on admission. Based on the above, the decision was made to proceed with D&C with suction. Prior to the procedure we discussed risks, benefits and possible complications of  procedure which included, but were not limited to risk of uterine perforation, that might require additional procedure, risk of infection, risk of fluid overload, risk of bleeding and blood transfusion which carries risk for HIV or hepatitis transmission, allergic reaction, risk of adhesive disease or scar formation in the uterus, risk of blood clot/embolism, risk of anesthesia, cardiac arrest, and remote risk of death. Patient accepted the risks and elected to proceed with procedure. The patient had eaten 3 hours prior, but the procedure was declared emergent due to the heaviness of the bleeding. PROCEDURE: The patient was taken to the operating room with IV running. General anesthesia was administered without difficulty and was adequate. The patient was then placed in the dorsal lithotomy position with her legs in candy-cane stirrups. An examination under anesthesia revealed a mobile anteverted  uterus, enlarged to ten weeks pregnancy, with regular surface. The patient was then prepped and draped in a regular sterile fashion. Time-out was performed.       Two Figueroa retractors were utilized in the patient's vagina, cervix was visualized and  grabbed with an Allis at the anterior lip. The cervical canal was  Already dilated and tissue was and blood clot was present in the os. The size 10 suction currette was used to remove the contents of the uterine cavity in 2 passes. A medium-size curette was used to gently curettage the uterine cavity in a clockwise fashion in all aspects of the uterus. The suction curette was again passed and any remaining tissue and blood was removed. Ultrasound was then performed, noting a thin endometrial stripe and no remaining POC within the uterus. Following this, the procedure was abandoned. The Allis was removed from the cervix and good hemostasis was noted. The patient tolerated the procedure well. The instrument and sponge counts were correct x 2. The patient was awakened from general anesthesia and taken to the recovery room in satisfactory condition. She will follow up with me in the office in one week for the discussion of the results of pathology and future management plan.

## 2022-08-25 NOTE — FLOWSHEET NOTE
Pt up to bathroom, able to void 300mls. Pt tolerating PO fluids well and desires to be discharged soon. Pt denies any pain. Bleeding scant to none. Pt getting dressed for discharge.

## 2022-08-25 NOTE — FLOWSHEET NOTE
Pt transferred from OR 1 to OB recovery [place] 2 after D&C. Pt becoming more alert. Denies any pain. New silvio pads and bed pads placed.

## 2022-08-25 NOTE — FLOWSHEET NOTE
Pt alert and orientated. Pt denies any nausea or pain at this time. Pt transferred to Triage D for duration of recovery with visitor. Bleeding scant to none. Water provided. Denies any further needs.

## 2022-08-25 NOTE — FLOWSHEET NOTE
IV removed with no complications. Pt given bracelet and card for loss. Discharge instructions reviewed with pt and signed. Pt denies any questions. Pt ambulatory with .

## 2022-08-25 NOTE — H&P
Department of Obstetrics and Gynecology   Obstetrics History and Physical        CHIEF COMPLAINT:  vaginal bleeding    HISTORY OF PRESENT ILLNESS:    Petey Davis  is a 40 y.o.  female at 6w6d presents with a chief complaint as above and is being admitted for vaginal bleeding and molar pregnancy      PAST OB HISTORY:  OB History          2    Para   1    Term   1            AB        Living   1         SAB        IAB        Ectopic        Molar        Multiple   0    Live Births   1          Obstetric Comments   Go             Past Medical History:        Diagnosis Date    Abnormal Pap smear of cervix     leep 2009    MRSA (methicillin resistant staph aureus) culture positive 2009    cultured in urine, done for frequent UTIs in grad school. Possible contaminant.      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 , LEEP    WISDOM TOOTH EXTRACTION       Allergies:  Sulfa antibiotics  Social History:    Social History     Socioeconomic History    Marital status:      Spouse name: Not on file    Number of children: Not on file    Years of education: Not on file    Highest education level: Not on file   Occupational History    Not on file   Tobacco Use    Smoking status: Never    Smokeless tobacco: Never   Substance and Sexual Activity    Alcohol use: Yes     Comment: 6 per wk    Drug use: No    Sexual activity: Yes     Partners: Male   Other Topics Concern    Not on file   Social History Narrative    Not on file     Social Determinants of Health     Financial Resource Strain: Not on file   Food Insecurity: Not on file   Transportation Needs: Not on file   Physical Activity: Not on file   Stress: Not on file   Social Connections: Not on file   Intimate Partner Violence: Not on file   Housing Stability: Not on file     Family History:       Problem Relation Age of Onset    High Blood Pressure Mother     Cancer Maternal Aunt         breast     High Blood Pressure Maternal Grandmother     Diabetes Father     Cancer Paternal Grandfather         lung     Medications Prior to Admission:  Medications Prior to Admission: Misc. Devices KIT, Nature's Blend Prenatal Multivitamin with Minerals [facility] Baby Box)  Stevie Aguayo 47: [address]-70; Take 1 softgel by mouth daily or as instructed by provider;  #qs for 6 months    REVIEW OF SYSTEMS:  negative     PHYSICAL EXAM:    Vitals:    08/24/22 2011   BP: 129/84   Pulse: 94   Resp: 18   Temp: 98.4 °F (36.9 °C)   TempSrc: Oral   SpO2: 98%   Weight: 163 lb (73.9 kg)   Height: 5' 7\" (1.702 m)     General appearance:  awake, alert, cooperative, no apparent distress, and appears stated age  Neurologic:  Awake, alert, oriented to name, place and time. Lungs:  No increased work of breathing, good air exchange  Abdomen:  Soft, non tender  Labs: CBC:   Lab Results   Component Value Date/Time    WBC 6.5 08/22/2022 11:10 AM    RBC 4.22 08/22/2022 11:10 AM    HGB 13.7 08/22/2022 11:10 AM    HCT 39.2 08/22/2022 11:10 AM    MCV 92.8 08/22/2022 11:10 AM    MCH 32.5 08/22/2022 11:10 AM    MCHC 35.0 08/22/2022 11:10 AM    RDW 12.6 08/22/2022 11:10 AM     08/22/2022 11:10 AM    MPV 8.7 08/22/2022 11:10 AM       ASSESSMENT:  Molar pregnancy    PLAN: Admit, D&C with suction. I have presented reasonable alternatives to the patient's proposed care, treatment, and services. The discussion I have done encompassed risks, benefits, and side effects related to the alternatives and the risks related to not receiving the proposed care, treatment, and services. All questions answered. Patient wishes to proceed. The surgical site was confirmed by the patient and me.       Electronically signed by Darren Mcdowell MD on 8/24/2022 at 8:28 PM

## 2022-08-25 NOTE — FLOWSHEET NOTE
Pt to [facility] triage with c/o heavy vaginal bleeding that began this evening. Pt states she found out 2 days ago that she had a molar pregnancy and is aware of the potential need for a D&C. Pt states also having some mild lower abdominal cramping she rates 2/10. VSS. Dr Missy Wilhelm aware of pt status, will be in to evaluate.

## 2022-08-29 NOTE — DISCHARGE SUMMARY
Department of Obstetrics and Gynecology  Gynecologic Discharge Summary      Admit Date: 8/24/2022    Admit Diagnosis: suspected Molar pregnancy [O02.0]    Discharge Date: 8/24/2022 11:50 PM     Discharge Diagnoses: Molar pregnancy       Medication List        CONTINUE taking these medications      Misc. Devices Kit  Nature's Blend Prenatal Multivitamin with Minerals [facility] Baby Box)  Stevie Aguayo 47: [address]-04; Take 1 softgel by mouth daily or as instructed by provider;  #qs for 6 months              Service: Gynecology    Consults: none    Significant Diagnostic Studies: none    Treatments: surgery: D&C with suction    Condition at Discharge: good    Hospital Course: uncomplicated    Discharge Instructions: Activity: no sex for 2 weeks     Diet: regular diet    Instructions: No driving while using pain medications, no heavy lifting, vaginal rest for 2 weeks, keep incision clean and dry, call office if have fever>100.4 F, inability to void for >6 h, severe abdominal pain, vaginal bleeding, or constipation.        Discharge to: Home    Disposition / Followup: Return to office in 1 weeks    Electronically signed by Ilan Murrieta MD on 8/29/2022 at 5:59 PM

## 2022-08-31 LAB
BASOPHILS ABSOLUTE: 0 K/UL (ref 0–0.2)
BASOPHILS RELATIVE PERCENT: 0.6 %
EOSINOPHILS ABSOLUTE: 0.1 K/UL (ref 0–0.6)
EOSINOPHILS RELATIVE PERCENT: 2.5 %
GONADOTROPIN, CHORIONIC (HCG) QUANT: 57.6 MIU/ML
HCT VFR BLD CALC: 31.1 % (ref 36–48)
HEMOGLOBIN: 11.1 G/DL (ref 12–16)
LYMPHOCYTES ABSOLUTE: 1.9 K/UL (ref 1–5.1)
LYMPHOCYTES RELATIVE PERCENT: 35.8 %
MCH RBC QN AUTO: 32.7 PG (ref 26–34)
MCHC RBC AUTO-ENTMCNC: 35.6 G/DL (ref 31–36)
MCV RBC AUTO: 91.9 FL (ref 80–100)
MONOCYTES ABSOLUTE: 0.3 K/UL (ref 0–1.3)
MONOCYTES RELATIVE PERCENT: 6.7 %
NEUTROPHILS ABSOLUTE: 2.8 K/UL (ref 1.7–7.7)
NEUTROPHILS RELATIVE PERCENT: 54.4 %
PDW BLD-RTO: 12.6 % (ref 12.4–15.4)
PLATELET # BLD: 297 K/UL (ref 135–450)
PMV BLD AUTO: 8 FL (ref 5–10.5)
RBC # BLD: 3.38 M/UL (ref 4–5.2)
WBC # BLD: 5.2 K/UL (ref 4–11)

## 2022-12-02 LAB
C. TRACHOMATIS, EXTERNAL RESULT: NEGATIVE
N. GONORRHOEAE, EXTERNAL RESULT: NEGATIVE

## 2022-12-05 ENCOUNTER — OFFICE VISIT (OUTPATIENT)
Dept: DERMATOLOGY | Age: 37
End: 2022-12-05
Payer: COMMERCIAL

## 2022-12-05 DIAGNOSIS — L30.0 NUMMULAR DERMATITIS: Primary | ICD-10-CM

## 2022-12-05 LAB
ABO, EXTERNAL RESULT: NORMAL
HEP B, EXTERNAL RESULT: NORMAL
HEPATITIS C ANTIBODY, EXTERNAL RESULT: NORMAL
HIV, EXTERNAL RESULT: NORMAL
RH FACTOR, EXTERNAL RESULT: POSITIVE
RPR, EXTERNAL RESULT: NORMAL
RUBELLA TITER, EXTERNAL RESULT: NORMAL

## 2022-12-05 PROCEDURE — 99213 OFFICE O/P EST LOW 20 MIN: CPT | Performed by: DERMATOLOGY

## 2022-12-05 NOTE — PROGRESS NOTES
[facility]) Dermatology  Art Fernandez M.D.  [phone]       Jacquelene Buerger  1985    40 y.o. female     Date of Visit: 12/5/2022    Chief Complaint:   Chief Complaint   Patient presents with    Rash     R shoulder        I was asked to see this patient by Dr. Argueta ref. provider found. History of Present Illness:  1. Patient presents today for evaluation of an annular erythematous plaque right posterior upper back-present for the last month. Seems to be improving. No other similar lesions. No history of eczema previously. Not previously treated. Currently 7 weeks pregnant. PH negative DN/ skin cancer  FH negative for skin cancer/ DN    Review of Systems:  Constitutional: Reports general sense of well-being       Past Medical History, Surgical History, Family History, Medications and Allergies reviewed. Social History:   Social History     Socioeconomic History    Marital status:      Spouse name: Not on file    Number of children: Not on file    Years of education: Not on file    Highest education level: Not on file   Occupational History    Not on file   Tobacco Use    Smoking status: Never    Smokeless tobacco: Never   Vaping Use    Vaping Use: Never used   Substance and Sexual Activity    Alcohol use: Yes     Comment: 6 per wk    Drug use: No    Sexual activity: Yes     Partners: Male   Other Topics Concern    Not on file   Social History Narrative    Not on file     Social Determinants of Health     Financial Resource Strain: Not on file   Food Insecurity: Not on file   Transportation Needs: Not on file   Physical Activity: Not on file   Stress: Not on file   Social Connections: Not on file   Intimate Partner Violence: Not on file   Housing Stability: Not on file       Physical Examination       -General: Well-appearing, NAD  1. Annular erythematous plaque right posterior shoulder      Assessment and Plan     1. Nummular dermatitis-trial of Aquaphor nightly.   Discussed moisturizers-CeraVe or Cetaphil. If no improvement over the next month, patient will let me know and we can consider topical triamcinolone 0.1% cream twice daily after she finishes her first trimester with approval of her obstetrician.     Reevaluate if increases in size or does not resolve

## 2022-12-06 LAB
ABO/RH: NORMAL
ANTIBODY SCREEN: NORMAL
BASOPHILS ABSOLUTE: 0 K/UL (ref 0–0.2)
BASOPHILS RELATIVE PERCENT: 0.5 %
EOSINOPHILS ABSOLUTE: 0 K/UL (ref 0–0.6)
EOSINOPHILS RELATIVE PERCENT: 0.6 %
HCT VFR BLD CALC: 37.1 % (ref 36–48)
HEMOGLOBIN: 12.5 G/DL (ref 12–16)
HEPATITIS B SURFACE ANTIGEN INTERPRETATION: NORMAL
HEPATITIS C ANTIBODY INTERPRETATION: NORMAL
HIV AG/AB: NORMAL
HIV ANTIGEN: NORMAL
HIV-1 ANTIBODY: NORMAL
HIV-2 AB: NORMAL
LYMPHOCYTES ABSOLUTE: 2 K/UL (ref 1–5.1)
LYMPHOCYTES RELATIVE PERCENT: 24.2 %
MCH RBC QN AUTO: 29.8 PG (ref 26–34)
MCHC RBC AUTO-ENTMCNC: 33.7 G/DL (ref 31–36)
MCV RBC AUTO: 88.4 FL (ref 80–100)
MONOCYTES ABSOLUTE: 0.5 K/UL (ref 0–1.3)
MONOCYTES RELATIVE PERCENT: 6.3 %
NEUTROPHILS ABSOLUTE: 5.7 K/UL (ref 1.7–7.7)
NEUTROPHILS RELATIVE PERCENT: 68.4 %
PDW BLD-RTO: 13.9 % (ref 12.4–15.4)
PLATELET # BLD: 294 K/UL (ref 135–450)
PMV BLD AUTO: 8.9 FL (ref 5–10.5)
RBC # BLD: 4.2 M/UL (ref 4–5.2)
RUBELLA ANTIBODY IGG: 156.4 IU/ML
TOTAL SYPHILLIS IGG/IGM: NORMAL
WBC # BLD: 8.3 K/UL (ref 4–11)

## 2023-01-23 ENCOUNTER — TELEPHONE (OUTPATIENT)
Dept: FAMILY MEDICINE CLINIC | Age: 38
End: 2023-01-23

## 2023-01-23 NOTE — TELEPHONE ENCOUNTER
-Pt requesting advise for stomach & lower back pain for 2 days, no other symptoms.   -Pt is 14 weeks pregnant.   -Pt was advised to contact her OBGYN today & Pt was offered a New To Provider appt tomorrow 1/24/23 to get re-established but Pt declined at this time.   -Pt states she will call her OBGYN today 1/23/23.

## 2023-01-24 ENCOUNTER — PATIENT MESSAGE (OUTPATIENT)
Dept: FAMILY MEDICINE CLINIC | Age: 38
End: 2023-01-24

## 2023-01-24 DIAGNOSIS — R10.11 RUQ PAIN: Primary | ICD-10-CM

## 2023-01-24 NOTE — TELEPHONE ENCOUNTER
From: Yusra Rees  To: Dr. Sweta Barbosa  Sent: 1/24/2023 10:03 AM EST  Subject: Stomach Pain/Gallbladder issue?    Hi Dr Barbosa! How are you?   I was wondering what to do next - I’ve had upper abdominal pain radiating into my back about 8x in the past two years. I just had it on Sunday night and it lasted about 18 hours (longer than usual). I was kind of thinking it was gallbladder and would be interested in getting an ultrasound. But I wasn’t sure where/how to start. Would that be something I would do with you? Let me know what you think would be best! Also i’m 14 weeks pregnant! So I’m sure that didn’t help…

## 2023-02-21 ENCOUNTER — HOSPITAL ENCOUNTER (OUTPATIENT)
Dept: ULTRASOUND IMAGING | Age: 38
Discharge: HOME | End: 2023-02-21
Payer: COMMERCIAL

## 2023-02-21 DIAGNOSIS — R10.11 RUQ PAIN: ICD-10-CM

## 2023-02-21 PROCEDURE — 76705 ECHO EXAM OF ABDOMEN: CPT

## 2023-02-24 NOTE — TELEPHONE ENCOUNTER
Dr. Meliton Goins, patient interested in free prenatal vitamins and iron per response from [address] [facility]. Order for [facility] Baby Box pending for your convenience. Thank you,  Damian Gould, PharmD  [address] [place] [address]  Department, toll free: [phone]  [address]      =======================================================================    [place] REVIEW - Be Well With Baby    NOEMÍ Perez is a 45 y.o. female currently identified as interested in receiving a [facility] \"Baby Box\" containing a 1 year supply of prenatal vitamins from [facility]. Contents of Baby Box:  Welcome Letter  6 month supply of Nature's Blend Prenatal Multivitamin with Minerals (Take 1 softgel once daily) with 1 refill    Thank you  Mery Gould, PharmD  [address] [place] [address]  Department, toll free: [phone]  [address]

## 2023-04-05 LAB
CHOLEST SERPL-MCNC: 251 MG/DL (ref 0–199)
GLUCOSE SERPL-MCNC: 91 MG/DL (ref 70–99)
HDLC SERPL-MCNC: 59 MG/DL (ref 40–60)
LDLC SERPL CALC-MCNC: 164 MG/DL
TRIGL SERPL-MCNC: 140 MG/DL (ref 0–150)

## 2023-05-04 ENCOUNTER — CARE COORDINATION (OUTPATIENT)
Dept: OTHER | Facility: CLINIC | Age: 38
End: 2023-05-04

## 2023-05-04 NOTE — CARE COORDINATION
Patient eligible for [facility] Manager contacted the patient by telephone to discuss the maternity management program.  Patient agrees to care management services at this time. Verified name and  with patient as identifiers. Patient Current Location: Home: [address]      Call within 2 business days of discharge: No     Last Discharge [address]       Date Complaint Diagnosis Description Type Department Provider    22 Vaginal Bleeding  Admission (Discharged) AIDE TIERNEY&MERVIN Delgado MD            Risk Factors Identified:  None      Needs to be reviewed by the provider   none         Method of communication with provider : none    Advance Care Planning:   Does patient have an Advance Directive:  reviewed and current. Does patient have OB/Gyn Selected? Yes    Discussed follow up appointments. If no appointment was previously scheduled, appointment scheduling offered: [facility] follow up appointment(s): No future appointments. Non-[facility] follow up appointment(s): n/a    OB History:   OB History    Para Term  AB Living   2 1 1   1 1   SAB IAB Ectopic Molar Multiple Live Births         1 0 1      # Outcome Date GA Lbr Lazarus/2nd Weight Sex Delivery Anes PTL Lv   2 Term 20 39w0d 16:30 / 03:14 8 lb 9.2 oz (3.89 kg) M Vag-Spont EPI N RAMA   1 Molar     U          Obstetric Comments   Go       47w0d    Medication reconciliation was performed with patient, who verbalizes understanding of administration of home medications. Advised obtaining a 90-day supply of all daily and as-needed medications. Barriers/Support system:  patient and spouse  Return to work planning? N/A      2nd and 3rd Trimester Focused Assessment:   Healthy behavior review  Genetic Screening completed/reviewed-normal  MFM referral needed?  No  Fetal movement-every day  Red flags: bleeding/leaking  Labor signs and

## 2023-06-29 LAB — GBS, EXTERNAL RESULT: NEGATIVE

## 2023-07-11 ENCOUNTER — CARE COORDINATION (OUTPATIENT)
Dept: OTHER | Facility: CLINIC | Age: 38
End: 2023-07-11

## 2023-07-11 NOTE — CARE COORDINATION
Patient Current Location: Home: [facility]    Last Discharge [address] [facility]       Date Complaint Diagnosis Description Type Department Provider    22 Vaginal Bleeding  Admission (Discharged) AIDE Agrawal MD            Maternity Care Manager contacted the patient by telephone to discuss the maternity management program.  Patient agrees to care management services at this time. Verified name and  with patient as identifiers. Risk Factors Identified:  None      Needs to be reviewed by the provider   none         Method of communication with provider : none    Advance Care Planning:   Does patient have an Advance Directive:  reviewed and current. Does patient have OB/Gyn Selected? Yes    Discussed follow up appointments. If no appointment was previously scheduled, appointment scheduling offered: Yes  [facility] follow up appointment(s):   Future Appointments   Date Time Provider [address]   2023  5:00 AM WEST LD TRIAGE RM A AIDE OP JOHN [facility]     Non-[facility] follow up appointment(s): n/a    OB History:   OB History    Para Term  AB Living   2 1 1   1 1   SAB IAB Ectopic Molar Multiple Live Births         1 0 1      # Outcome Date GA Lbr Lazarus/2nd Weight Sex Delivery Anes PTL Lv   2 Term 20 39w0d 16:30 / 03:14 8 lb 9.2 oz (3.89 kg) M Vag-Spont EPI N RAMA   1 Molar     U          Obstetric Comments   Go     [id number]  Unknown    Medication reconciliation was performed with patient, who verbalizes understanding of administration of home medications. Advised obtaining a 90-day supply of all daily and as-needed medications. Barriers/Support system:  patient and spouse  Return to work planning? At 14 weeks PP      2nd and 3rd Trimester Focused Assessment:   Healthy behavior review  Genetic Screening completed/reviewed-normal  MFM referral needed?  No  Fetal movement-every day  Red flags: bleeding/leaking  Labor signs and symptoms-None  Birth

## 2023-07-15 ENCOUNTER — HOSPITAL ENCOUNTER (INPATIENT)
Age: 38
LOS: 1 days | Discharge: HOME | End: 2023-07-16
Attending: OBSTETRICS & GYNECOLOGY | Admitting: OBSTETRICS & GYNECOLOGY
Payer: COMMERCIAL

## 2023-07-15 LAB
ABO + RH BLD: NORMAL
AMPHETAMINES UR QL SCN>1000 NG/ML: NORMAL
BARBITURATES UR QL SCN>200 NG/ML: NORMAL
BENZODIAZ UR QL SCN>200 NG/ML: NORMAL
BLD GP AB SCN SERPL QL: NORMAL
BUPRENORPHINE+NOR UR QL SCN: NORMAL
CANNABINOIDS UR QL SCN>50 NG/ML: NORMAL
COCAINE UR QL SCN: NORMAL
DEPRECATED RDW RBC AUTO: 13.9 % (ref 12.4–15.4)
DRUG SCREEN COMMENT UR-IMP: NORMAL
FENTANYL SCREEN, URINE: NORMAL
HCT VFR BLD AUTO: 37.8 % (ref 36–48)
HGB BLD-MCNC: 13.6 G/DL (ref 12–16)
MCH RBC QN AUTO: 32.8 PG (ref 26–34)
MCHC RBC AUTO-ENTMCNC: 35.8 G/DL (ref 31–36)
MCV RBC AUTO: 91.4 FL (ref 80–100)
METHADONE UR QL SCN>300 NG/ML: NORMAL
OPIATES UR QL SCN>300 NG/ML: NORMAL
OXYCODONE UR QL SCN: NORMAL
PCP UR QL SCN>25 NG/ML: NORMAL
PH UR STRIP: 6 [PH]
PLATELET # BLD AUTO: 205 K/UL (ref 135–450)
PMV BLD AUTO: 9.3 FL (ref 5–10.5)
RBC # BLD AUTO: 4.14 M/UL (ref 4–5.2)
REAGIN+T PALLIDUM IGG+IGM SERPL-IMP: NORMAL
WBC # BLD AUTO: 12 K/UL (ref 4–11)

## 2023-07-15 PROCEDURE — 86900 BLOOD TYPING SEROLOGIC ABO: CPT

## 2023-07-15 PROCEDURE — 2580000003 HC RX 258: Performed by: OBSTETRICS & GYNECOLOGY

## 2023-07-15 PROCEDURE — 6370000000 HC RX 637 (ALT 250 FOR IP): Performed by: OBSTETRICS & GYNECOLOGY

## 2023-07-15 PROCEDURE — 80307 DRUG TEST PRSMV CHEM ANLYZR: CPT

## 2023-07-15 PROCEDURE — 59025 FETAL NON-STRESS TEST: CPT

## 2023-07-15 PROCEDURE — 86850 RBC ANTIBODY SCREEN: CPT

## 2023-07-15 PROCEDURE — 86780 TREPONEMA PALLIDUM: CPT

## 2023-07-15 PROCEDURE — 1220000000 HC SEMI PRIVATE OB R&B

## 2023-07-15 PROCEDURE — 2500000003 HC RX 250 WO HCPCS

## 2023-07-15 PROCEDURE — 0KQM0ZZ REPAIR PERINEUM MUSCLE, OPEN APPROACH: ICD-10-PCS | Performed by: OBSTETRICS & GYNECOLOGY

## 2023-07-15 PROCEDURE — 7200000001 HC VAGINAL DELIVERY

## 2023-07-15 PROCEDURE — 6360000002 HC RX W HCPCS

## 2023-07-15 PROCEDURE — 86901 BLOOD TYPING SEROLOGIC RH(D): CPT

## 2023-07-15 PROCEDURE — 6360000002 HC RX W HCPCS: Performed by: OBSTETRICS & GYNECOLOGY

## 2023-07-15 PROCEDURE — 85027 COMPLETE CBC AUTOMATED: CPT

## 2023-07-15 RX ORDER — SODIUM CHLORIDE, SODIUM LACTATE, POTASSIUM CHLORIDE, AND CALCIUM CHLORIDE .6; .31; .03; .02 G/100ML; G/100ML; G/100ML; G/100ML
1000 INJECTION, SOLUTION INTRAVENOUS ONCE
Status: COMPLETED | OUTPATIENT
Start: 2023-07-15 | End: 2023-07-15

## 2023-07-15 RX ORDER — OXYCODONE HYDROCHLORIDE 5 MG/1
5 TABLET ORAL EVERY 4 HOURS PRN
Status: DISCONTINUED | OUTPATIENT
Start: 2023-07-15 | End: 2023-07-16 | Stop reason: HOSPADM

## 2023-07-15 RX ORDER — SODIUM CHLORIDE, SODIUM LACTATE, POTASSIUM CHLORIDE, CALCIUM CHLORIDE 600; 310; 30; 20 MG/100ML; MG/100ML; MG/100ML; MG/100ML
INJECTION, SOLUTION INTRAVENOUS CONTINUOUS
Status: DISCONTINUED | OUTPATIENT
Start: 2023-07-15 | End: 2023-07-15

## 2023-07-15 RX ORDER — SODIUM CHLORIDE 0.9 % (FLUSH) 0.9 %
5-40 SYRINGE (ML) INJECTION PRN
Status: DISCONTINUED | OUTPATIENT
Start: 2023-07-15 | End: 2023-07-15

## 2023-07-15 RX ORDER — SODIUM CHLORIDE 0.9 % (FLUSH) 0.9 %
5-40 SYRINGE (ML) INJECTION EVERY 12 HOURS SCHEDULED
Status: DISCONTINUED | OUTPATIENT
Start: 2023-07-15 | End: 2023-07-16 | Stop reason: HOSPADM

## 2023-07-15 RX ORDER — SODIUM CHLORIDE 0.9 % (FLUSH) 0.9 %
5-40 SYRINGE (ML) INJECTION EVERY 12 HOURS SCHEDULED
Status: DISCONTINUED | OUTPATIENT
Start: 2023-07-15 | End: 2023-07-15

## 2023-07-15 RX ORDER — IBUPROFEN 600 MG/1
600 TABLET ORAL EVERY 8 HOURS PRN
Status: DISCONTINUED | OUTPATIENT
Start: 2023-07-15 | End: 2023-07-16 | Stop reason: HOSPADM

## 2023-07-15 RX ORDER — ACETAMINOPHEN 500 MG
1000 TABLET ORAL EVERY 8 HOURS PRN
Status: DISCONTINUED | OUTPATIENT
Start: 2023-07-15 | End: 2023-07-16 | Stop reason: HOSPADM

## 2023-07-15 RX ORDER — SODIUM CHLORIDE, SODIUM LACTATE, POTASSIUM CHLORIDE, AND CALCIUM CHLORIDE .6; .31; .03; .02 G/100ML; G/100ML; G/100ML; G/100ML
500 INJECTION, SOLUTION INTRAVENOUS PRN
Status: DISCONTINUED | OUTPATIENT
Start: 2023-07-15 | End: 2023-07-15 | Stop reason: HOSPADM

## 2023-07-15 RX ORDER — DOCUSATE SODIUM 100 MG/1
100 CAPSULE, LIQUID FILLED ORAL 2 TIMES DAILY
Status: DISCONTINUED | OUTPATIENT
Start: 2023-07-15 | End: 2023-07-16 | Stop reason: HOSPADM

## 2023-07-15 RX ORDER — SODIUM CHLORIDE, SODIUM LACTATE, POTASSIUM CHLORIDE, AND CALCIUM CHLORIDE .6; .31; .03; .02 G/100ML; G/100ML; G/100ML; G/100ML
1000 INJECTION, SOLUTION INTRAVENOUS PRN
Status: DISCONTINUED | OUTPATIENT
Start: 2023-07-15 | End: 2023-07-15 | Stop reason: HOSPADM

## 2023-07-15 RX ORDER — SODIUM CHLORIDE 9 MG/ML
25 INJECTION, SOLUTION INTRAVENOUS PRN
Status: DISCONTINUED | OUTPATIENT
Start: 2023-07-15 | End: 2023-07-15

## 2023-07-15 RX ORDER — MISOPROSTOL 200 UG/1
800 TABLET ORAL PRN
Status: DISCONTINUED | OUTPATIENT
Start: 2023-07-15 | End: 2023-07-15 | Stop reason: HOSPADM

## 2023-07-15 RX ORDER — METHYLERGONOVINE MALEATE 0.2 MG/ML
200 INJECTION INTRAVENOUS PRN
Status: DISCONTINUED | OUTPATIENT
Start: 2023-07-15 | End: 2023-07-15 | Stop reason: HOSPADM

## 2023-07-15 RX ORDER — ONDANSETRON 2 MG/ML
4 INJECTION INTRAMUSCULAR; INTRAVENOUS EVERY 6 HOURS PRN
Status: DISCONTINUED | OUTPATIENT
Start: 2023-07-15 | End: 2023-07-15 | Stop reason: HOSPADM

## 2023-07-15 RX ORDER — SODIUM CHLORIDE, SODIUM LACTATE, POTASSIUM CHLORIDE, CALCIUM CHLORIDE 600; 310; 30; 20 MG/100ML; MG/100ML; MG/100ML; MG/100ML
INJECTION, SOLUTION INTRAVENOUS CONTINUOUS
Status: DISCONTINUED | OUTPATIENT
Start: 2023-07-15 | End: 2023-07-16 | Stop reason: HOSPADM

## 2023-07-15 RX ORDER — SODIUM CHLORIDE 0.9 % (FLUSH) 0.9 %
5-40 SYRINGE (ML) INJECTION PRN
Status: DISCONTINUED | OUTPATIENT
Start: 2023-07-15 | End: 2023-07-16 | Stop reason: HOSPADM

## 2023-07-15 RX ORDER — SODIUM CHLORIDE 9 MG/ML
INJECTION, SOLUTION INTRAVENOUS PRN
Status: DISCONTINUED | OUTPATIENT
Start: 2023-07-15 | End: 2023-07-16 | Stop reason: HOSPADM

## 2023-07-15 RX ORDER — LIDOCAINE HYDROCHLORIDE 10 MG/ML
30 INJECTION, SOLUTION EPIDURAL; INFILTRATION; INTRACAUDAL; PERINEURAL PRN
Status: COMPLETED | OUTPATIENT
Start: 2023-07-15 | End: 2023-07-15

## 2023-07-15 RX ORDER — LANOLIN 100 %
OINTMENT (GRAM) TOPICAL PRN
Status: DISCONTINUED | OUTPATIENT
Start: 2023-07-15 | End: 2023-07-16 | Stop reason: HOSPADM

## 2023-07-15 RX ORDER — CARBOPROST TROMETHAMINE 250 UG/ML
250 INJECTION, SOLUTION INTRAMUSCULAR PRN
Status: DISCONTINUED | OUTPATIENT
Start: 2023-07-15 | End: 2023-07-15

## 2023-07-15 RX ORDER — ACETAMINOPHEN 325 MG/1
650 TABLET ORAL EVERY 4 HOURS PRN
Status: DISCONTINUED | OUTPATIENT
Start: 2023-07-15 | End: 2023-07-15

## 2023-07-15 RX ADMIN — IBUPROFEN 600 MG: 600 TABLET, FILM COATED ORAL at 06:05

## 2023-07-15 RX ADMIN — SODIUM CHLORIDE, POTASSIUM CHLORIDE, SODIUM LACTATE AND CALCIUM CHLORIDE 1000 ML: 600; 310; 30; 20 INJECTION, SOLUTION INTRAVENOUS at 04:03

## 2023-07-15 RX ADMIN — Medication 87.3 MILLI-UNITS/MIN: at 04:35

## 2023-07-15 RX ADMIN — Medication 87.3 MILLI-UNITS/MIN: at 04:57

## 2023-07-15 RX ADMIN — LIDOCAINE HYDROCHLORIDE 10 ML: 10 INJECTION, SOLUTION EPIDURAL; INFILTRATION; INTRACAUDAL; PERINEURAL at 04:35

## 2023-07-15 RX ADMIN — DOCUSATE SODIUM 100 MG: 100 CAPSULE, LIQUID FILLED ORAL at 07:34

## 2023-07-15 RX ADMIN — IBUPROFEN 600 MG: 600 TABLET, FILM COATED ORAL at 14:20

## 2023-07-15 RX ADMIN — Medication 87.3 MILLI-UNITS/MIN: at 04:36

## 2023-07-15 RX ADMIN — Medication 166.7 ML: at 04:37

## 2023-07-15 RX ADMIN — ACETAMINOPHEN 1000 MG: 500 TABLET ORAL at 07:33

## 2023-07-15 RX ADMIN — ACETAMINOPHEN 1000 MG: 500 TABLET ORAL at 17:30

## 2023-07-15 RX ADMIN — DOCUSATE SODIUM 100 MG: 100 CAPSULE, LIQUID FILLED ORAL at 21:14

## 2023-07-15 RX ADMIN — BENZOCAINE AND LEVOMENTHOL: 200; 5 SPRAY TOPICAL at 08:10

## 2023-07-15 RX ADMIN — IBUPROFEN 600 MG: 600 TABLET, FILM COATED ORAL at 23:04

## 2023-07-15 RX ADMIN — WITCH HAZEL: 500 SOLUTION RECTAL; TOPICAL at 08:10

## 2023-07-15 RX ADMIN — BENZOCAINE AND LEVOMENTHOL: 200; 5 SPRAY TOPICAL at 21:19

## 2023-07-15 ASSESSMENT — PAIN DESCRIPTION - LOCATION
LOCATION: ABDOMEN
LOCATION: ABDOMEN;PERINEUM

## 2023-07-15 ASSESSMENT — PAIN DESCRIPTION - ONSET
ONSET: GRADUAL

## 2023-07-15 ASSESSMENT — PAIN DESCRIPTION - FREQUENCY
FREQUENCY: INTERMITTENT

## 2023-07-15 ASSESSMENT — PAIN SCALES - GENERAL
PAINLEVEL_OUTOF10: 5
PAINLEVEL_OUTOF10: 3
PAINLEVEL_OUTOF10: 4
PAINLEVEL_OUTOF10: 1
PAINLEVEL_OUTOF10: 6
PAINLEVEL_OUTOF10: 2
PAINLEVEL_OUTOF10: 2

## 2023-07-15 ASSESSMENT — PAIN - FUNCTIONAL ASSESSMENT
PAIN_FUNCTIONAL_ASSESSMENT: ACTIVITIES ARE NOT PREVENTED

## 2023-07-15 ASSESSMENT — PAIN DESCRIPTION - DESCRIPTORS
DESCRIPTORS: CRAMPING;ACHING
DESCRIPTORS: CRAMPING;DISCOMFORT;PRESSURE
DESCRIPTORS: CRAMPING;DISCOMFORT;PRESSURE
DESCRIPTORS: SORE

## 2023-07-15 ASSESSMENT — PAIN DESCRIPTION - PAIN TYPE
TYPE: ACUTE PAIN

## 2023-07-15 ASSESSMENT — PAIN DESCRIPTION - ORIENTATION
ORIENTATION: LOWER

## 2023-07-16 VITALS
HEART RATE: 79 BPM | TEMPERATURE: 97.4 F | DIASTOLIC BLOOD PRESSURE: 88 MMHG | RESPIRATION RATE: 18 BRPM | OXYGEN SATURATION: 98 % | SYSTOLIC BLOOD PRESSURE: 149 MMHG

## 2023-07-16 PROBLEM — Z37.9 NORMAL LABOR: Status: RESOLVED | Noted: 2020-08-02 | Resolved: 2023-07-16

## 2023-07-16 PROCEDURE — 6370000000 HC RX 637 (ALT 250 FOR IP): Performed by: OBSTETRICS & GYNECOLOGY

## 2023-07-16 PROCEDURE — 90471 IMMUNIZATION ADMIN: CPT | Performed by: OBSTETRICS & GYNECOLOGY

## 2023-07-16 PROCEDURE — 6360000002 HC RX W HCPCS: Performed by: OBSTETRICS & GYNECOLOGY

## 2023-07-16 PROCEDURE — 90715 TDAP VACCINE 7 YRS/> IM: CPT | Performed by: OBSTETRICS & GYNECOLOGY

## 2023-07-16 RX ADMIN — ACETAMINOPHEN 1000 MG: 500 TABLET ORAL at 11:23

## 2023-07-16 RX ADMIN — IBUPROFEN 600 MG: 600 TABLET, FILM COATED ORAL at 07:12

## 2023-07-16 RX ADMIN — ACETAMINOPHEN 1000 MG: 500 TABLET ORAL at 03:49

## 2023-07-16 RX ADMIN — TETANUS TOXOID, REDUCED DIPHTHERIA TOXOID AND ACELLULAR PERTUSSIS VACCINE, ADSORBED 0.5 ML: 5; 2.5; 8; 8; 2.5 SUSPENSION INTRAMUSCULAR at 10:45

## 2023-07-16 ASSESSMENT — PAIN - FUNCTIONAL ASSESSMENT
PAIN_FUNCTIONAL_ASSESSMENT: ACTIVITIES ARE NOT PREVENTED

## 2023-07-16 ASSESSMENT — PAIN SCALES - GENERAL
PAINLEVEL_OUTOF10: 0
PAINLEVEL_OUTOF10: 2
PAINLEVEL_OUTOF10: 1
PAINLEVEL_OUTOF10: 1
PAINLEVEL_OUTOF10: 3
PAINLEVEL_OUTOF10: 2

## 2023-07-16 ASSESSMENT — PAIN DESCRIPTION - LOCATION
LOCATION: ABDOMEN

## 2023-07-16 ASSESSMENT — PAIN DESCRIPTION - DESCRIPTORS
DESCRIPTORS: CRAMPING
DESCRIPTORS: ACHING;CRAMPING;SORE
DESCRIPTORS: CRAMPING
DESCRIPTORS: ACHING;CRAMPING
DESCRIPTORS: CRAMPING;ACHING

## 2023-07-16 ASSESSMENT — PAIN DESCRIPTION - ORIENTATION
ORIENTATION: LOWER

## 2023-07-16 ASSESSMENT — PAIN DESCRIPTION - PAIN TYPE
TYPE: ACUTE PAIN

## 2023-07-16 ASSESSMENT — PAIN DESCRIPTION - FREQUENCY
FREQUENCY: INTERMITTENT

## 2023-07-16 ASSESSMENT — PAIN DESCRIPTION - ONSET
ONSET: ON-GOING

## 2023-07-27 ENCOUNTER — CARE COORDINATION (OUTPATIENT)
Dept: OTHER | Facility: CLINIC | Age: 38
End: 2023-07-27

## 2023-07-27 NOTE — CARE COORDINATION
Ambulatory Care Coordination Note    ACM attempted to reach patient for Associate Care Management related to PP follow-up call. HIPAA compliant message left requesting a return phone call at patient convenience. Plan for follow-up call in 7-10 days      No future appointments. JUANCARLOS Armenta, RN  Associate Care Manager   Cell: [phone]  [email or web address]

## 2023-07-28 ENCOUNTER — CARE COORDINATION (OUTPATIENT)
Dept: OTHER | Facility: CLINIC | Age: 38
End: 2023-07-28

## 2023-07-28 NOTE — CARE COORDINATION
Patient Current Location: Home: [facility]    Last Discharge [address]       Date Complaint Diagnosis Description Type Department Provider    7/15/23 Contractions  Admission (Discharged) Ladan Gomez MD            Maternity Care Manager contacted the patient by telephone to discuss the maternity management program.  Patient agrees to care management services at this time. Verified name and  with patient as identifiers. Risk Factors Identified:  None      Needs to be reviewed by the provider   none         Method of communication with provider : none    Advance Care Planning:   Does patient have an Advance Directive:  reviewed and current. Does patient have OB/Gyn Selected? Yes    Discussed follow up appointments. If no appointment was previously scheduled, appointment scheduling offered: Yes  [id number] [facility] follow up appointment(s): No future appointments. Non-[facility] follow up appointment(s): n/a    OB History:   OB History    Para Term  AB Living   3 2 2 0 1 2   SAB IAB Ectopic Molar Multiple Live Births   0 0 0 1 0 2      # Outcome Date GA Lbr Lazarus/2nd Weight Sex Delivery Anes PTL Lv   3 Term 07/15/23 39w2d  6 lb 15.8 oz (3.17 kg) M Vag-Spont None N RAMA   2 Molar 22 10w0d          1 Term 20 39w0d 16:30 / 03:14 8 lb 9.2 oz (3.89 kg) M Vag-Spont EPI N RAMA      Obstetric Comments   Go     [id number]  39w2d    Medication reconciliation was performed with patient, who verbalizes understanding of administration of home medications. Advised obtaining a 90-day supply of all daily and as-needed medications. Barriers/Support system:  patient and spouse  Return to work planning? At 14 weeks PP      Postpartum Assessment:  Vaginal  Lochia-minimal  Fever No  BM?  Yes   Decreased urinary output No  Perineal care-pads when needed  Visual changes No  Calf pain No  Headache No  Swelling No  Breast Pain No  Depression or feelings of sadness or

## 2023-08-10 DIAGNOSIS — K80.20 GALLSTONES: Primary | ICD-10-CM

## 2023-08-10 NOTE — PROGRESS NOTES
General and Laparoscopic Surgery- [place] [facility]  [address], [address] [facility]  Phone: [phone]

## 2023-08-28 ENCOUNTER — CARE COORDINATION (OUTPATIENT)
Dept: OTHER | Facility: CLINIC | Age: 38
End: 2023-08-28

## 2023-09-01 PROBLEM — B01.9 VARICELLA: Status: ACTIVE | Noted: 2023-09-01

## 2023-09-06 ENCOUNTER — INITIAL CONSULT (OUTPATIENT)
Dept: SURGERY | Age: 38
End: 2023-09-06
Payer: COMMERCIAL

## 2023-09-06 VITALS
OXYGEN SATURATION: 98 % | WEIGHT: 172 LBS | HEART RATE: 77 BPM | HEIGHT: 67 IN | SYSTOLIC BLOOD PRESSURE: 118 MMHG | DIASTOLIC BLOOD PRESSURE: 80 MMHG | BODY MASS INDEX: 27 KG/M2 | TEMPERATURE: 97.3 F

## 2023-09-06 DIAGNOSIS — K80.20 SYMPTOMATIC CHOLELITHIASIS: Primary | ICD-10-CM

## 2023-09-06 PROBLEM — O02.0 MOLAR PREGNANCY: Status: RESOLVED | Noted: 2022-08-24 | Resolved: 2023-09-06

## 2023-09-06 PROCEDURE — 99203 OFFICE O/P NEW LOW 30 MIN: CPT | Performed by: SURGERY

## 2023-09-08 ENCOUNTER — CARE COORDINATION (OUTPATIENT)
Dept: OTHER | Facility: CLINIC | Age: 38
End: 2023-09-08

## 2023-09-08 NOTE — CARE COORDINATION
Ambulatory Care Coordination Note    ACM attempted to reach patient for  Associate Care Management related to PP follow-up call. HIPAA compliant message left requesting a return phone call at patient convenience. Plan for follow-up call in 10-14 days      Future Appointments   Date Time Provider [address]   10/23/2023  8:15 AM MD Casper Villagran BSN, RN  Associate Care Manager   Cell: [phone]  [email or web address]

## 2023-09-11 NOTE — PROGRESS NOTES
Department of General Surgery Consult    PATIENT NAME: Cosme Krueger OF BIRTH: 1985    ADMISSION DATE: No admission date for patient encounter. TODAY'S DATE: 9/6/23    Reason for Consult:  symptomatic cholelithiasis    Chief Complaint: RUQ pain    Historian: patient    Requesting Physician:  Elia Melissa    HISTORY OF PRESENT ILLNESS:              The patient is a 45 y.o. female who presents with RUQ pain. Reports having several episodes during a before a recent pregnancy but none since last Winter/Spring. When they occurred there was some nausea. No fevers or chills. Focusing on lower fat diet. 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 Possible contaminant. 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 Prior to Admission medications    Medication Sig Start Date End Date Taking? Authorizing Provider   Misc. Devices KIT Nature's Blend Prenatal Multivitamin with Minerals [facility] Baby [facility])  [address]: [address]-37;   Take 1 softgel by mouth daily or as instructed by provider;  #qs for 6 months 2/27/23  Yes Sabine Vera MD        Allergies:  Sulfa antibiotics    Social History:   Social History     Socioeconomic History    Marital status:      Spouse name: Not on file    Number of children: Not on file    Years of education: Not on file    Highest education level: Not on file   Occupational History    Not on file   Tobacco Use    Smoking status:

## 2023-10-01 ENCOUNTER — CARE COORDINATION (OUTPATIENT)
Dept: OTHER | Facility: CLINIC | Age: 38
End: 2023-10-01

## 2024-05-31 ENCOUNTER — HOSPITAL ENCOUNTER (INPATIENT)
Facility: HOSPITAL | Age: 39
LOS: 3 days | Discharge: HOME | DRG: 818 | End: 2024-06-03
Attending: EMERGENCY MEDICINE | Admitting: OBSTETRICS & GYNECOLOGY
Payer: COMMERCIAL

## 2024-05-31 ENCOUNTER — APPOINTMENT (OUTPATIENT)
Dept: ULTRASOUND IMAGING | Facility: HOSPITAL | Age: 39
DRG: 818 | End: 2024-05-31
Payer: COMMERCIAL

## 2024-05-31 ENCOUNTER — HOSPITAL ENCOUNTER (OUTPATIENT)
Facility: HOSPITAL | Age: 39
Setting detail: SURGERY ADMIT
DRG: 818 | End: 2024-05-31
Attending: SURGERY | Admitting: SURGERY
Payer: COMMERCIAL

## 2024-05-31 ENCOUNTER — ANESTHESIA EVENT (OUTPATIENT)
Dept: PERIOP | Facility: HOSPITAL | Age: 39
End: 2024-05-31
Payer: COMMERCIAL

## 2024-05-31 DIAGNOSIS — K80.50 BILIARY COLIC: ICD-10-CM

## 2024-05-31 DIAGNOSIS — R10.9 INTRACTABLE ABDOMINAL PAIN: ICD-10-CM

## 2024-05-31 DIAGNOSIS — Z3A.23 23 WEEKS GESTATION OF PREGNANCY: ICD-10-CM

## 2024-05-31 DIAGNOSIS — K81.0 ACUTE CHOLECYSTITIS: ICD-10-CM

## 2024-05-31 DIAGNOSIS — K80.00 ACUTE CHOLECYSTITIS DUE TO BILIARY CALCULUS: Primary | ICD-10-CM

## 2024-05-31 LAB
ALBUMIN SERPL-MCNC: 3.8 G/DL (ref 3.5–5.2)
ALBUMIN/GLOB SERPL: 1.2 G/DL
ALP SERPL-CCNC: 72 U/L (ref 39–117)
ALT SERPL W P-5'-P-CCNC: 16 U/L (ref 1–33)
ANION GAP SERPL CALCULATED.3IONS-SCNC: 12 MMOL/L (ref 5–15)
AST SERPL-CCNC: 16 U/L (ref 1–32)
BACTERIA UR QL AUTO: ABNORMAL /HPF
BASOPHILS # BLD AUTO: 0.02 10*3/MM3 (ref 0–0.2)
BASOPHILS NFR BLD AUTO: 0.2 % (ref 0–1.5)
BILIRUB SERPL-MCNC: 0.3 MG/DL (ref 0–1.2)
BILIRUB UR QL STRIP: NEGATIVE
BUN SERPL-MCNC: 7 MG/DL (ref 6–20)
BUN/CREAT SERPL: 14.3 (ref 7–25)
CALCIUM SPEC-SCNC: 8.5 MG/DL (ref 8.6–10.5)
CHLORIDE SERPL-SCNC: 105 MMOL/L (ref 98–107)
CLARITY UR: ABNORMAL
CO2 SERPL-SCNC: 21 MMOL/L (ref 22–29)
COLOR UR: YELLOW
CREAT SERPL-MCNC: 0.49 MG/DL (ref 0.57–1)
DEPRECATED RDW RBC AUTO: 43.3 FL (ref 37–54)
EGFRCR SERPLBLD CKD-EPI 2021: 123.1 ML/MIN/1.73
EOSINOPHIL # BLD AUTO: 0.07 10*3/MM3 (ref 0–0.4)
EOSINOPHIL NFR BLD AUTO: 0.6 % (ref 0.3–6.2)
ERYTHROCYTE [DISTWIDTH] IN BLOOD BY AUTOMATED COUNT: 13 % (ref 12.3–15.4)
GLOBULIN UR ELPH-MCNC: 3.1 GM/DL
GLUCOSE SERPL-MCNC: 104 MG/DL (ref 65–99)
GLUCOSE UR STRIP-MCNC: NEGATIVE MG/DL
HCT VFR BLD AUTO: 35.3 % (ref 34–46.6)
HGB BLD-MCNC: 12.5 G/DL (ref 12–15.9)
HGB UR QL STRIP.AUTO: NEGATIVE
HOLD SPECIMEN: NORMAL
HYALINE CASTS UR QL AUTO: ABNORMAL /LPF
IMM GRANULOCYTES # BLD AUTO: 0.16 10*3/MM3 (ref 0–0.05)
IMM GRANULOCYTES NFR BLD AUTO: 1.4 % (ref 0–0.5)
KETONES UR QL STRIP: NEGATIVE
LEUKOCYTE ESTERASE UR QL STRIP.AUTO: NEGATIVE
LIPASE SERPL-CCNC: 49 U/L (ref 13–60)
LYMPHOCYTES # BLD AUTO: 1.55 10*3/MM3 (ref 0.7–3.1)
LYMPHOCYTES NFR BLD AUTO: 13.9 % (ref 19.6–45.3)
MCH RBC QN AUTO: 32.9 PG (ref 26.6–33)
MCHC RBC AUTO-ENTMCNC: 35.4 G/DL (ref 31.5–35.7)
MCV RBC AUTO: 92.9 FL (ref 79–97)
MONOCYTES # BLD AUTO: 0.44 10*3/MM3 (ref 0.1–0.9)
MONOCYTES NFR BLD AUTO: 3.9 % (ref 5–12)
NEUTROPHILS NFR BLD AUTO: 8.95 10*3/MM3 (ref 1.7–7)
NEUTROPHILS NFR BLD AUTO: 80 % (ref 42.7–76)
NITRITE UR QL STRIP: NEGATIVE
NRBC BLD AUTO-RTO: 0 /100 WBC (ref 0–0.2)
PH UR STRIP.AUTO: 7 [PH] (ref 5–8)
PLATELET # BLD AUTO: 236 10*3/MM3 (ref 140–450)
PMV BLD AUTO: 9.6 FL (ref 6–12)
POTASSIUM SERPL-SCNC: 3.7 MMOL/L (ref 3.5–5.2)
PROT SERPL-MCNC: 6.9 G/DL (ref 6–8.5)
PROT UR QL STRIP: NEGATIVE
QT INTERVAL: 404 MS
QTC INTERVAL: 404 MS
RBC # BLD AUTO: 3.8 10*6/MM3 (ref 3.77–5.28)
RBC # UR STRIP: ABNORMAL /HPF
REF LAB TEST METHOD: ABNORMAL
SODIUM SERPL-SCNC: 138 MMOL/L (ref 136–145)
SP GR UR STRIP: 1.02 (ref 1–1.03)
SQUAMOUS #/AREA URNS HPF: ABNORMAL /HPF
TROPONIN T SERPL HS-MCNC: <6 NG/L
UROBILINOGEN UR QL STRIP: ABNORMAL
WBC # UR STRIP: ABNORMAL /HPF
WBC NRBC COR # BLD AUTO: 11.19 10*3/MM3 (ref 3.4–10.8)
WHOLE BLOOD HOLD COAG: NORMAL
WHOLE BLOOD HOLD SPECIMEN: NORMAL

## 2024-05-31 PROCEDURE — 25010000002 ONDANSETRON PER 1 MG: Performed by: EMERGENCY MEDICINE

## 2024-05-31 PROCEDURE — 25810000003 SODIUM CHLORIDE 0.9 % SOLUTION: Performed by: EMERGENCY MEDICINE

## 2024-05-31 PROCEDURE — 99221 1ST HOSP IP/OBS SF/LOW 40: CPT | Performed by: OBSTETRICS & GYNECOLOGY

## 2024-05-31 PROCEDURE — 85025 COMPLETE CBC W/AUTO DIFF WBC: CPT | Performed by: EMERGENCY MEDICINE

## 2024-05-31 PROCEDURE — 96374 THER/PROPH/DIAG INJ IV PUSH: CPT

## 2024-05-31 PROCEDURE — 80053 COMPREHEN METABOLIC PANEL: CPT | Performed by: EMERGENCY MEDICINE

## 2024-05-31 PROCEDURE — 83690 ASSAY OF LIPASE: CPT | Performed by: EMERGENCY MEDICINE

## 2024-05-31 PROCEDURE — 96375 TX/PRO/DX INJ NEW DRUG ADDON: CPT

## 2024-05-31 PROCEDURE — 96376 TX/PRO/DX INJ SAME DRUG ADON: CPT

## 2024-05-31 PROCEDURE — 84484 ASSAY OF TROPONIN QUANT: CPT | Performed by: EMERGENCY MEDICINE

## 2024-05-31 PROCEDURE — 25810000003 DEXTROSE 5% IN LACTATED RINGERS PER 1000 ML: Performed by: SURGERY

## 2024-05-31 PROCEDURE — 86901 BLOOD TYPING SEROLOGIC RH(D): CPT

## 2024-05-31 PROCEDURE — 25810000003 DEXTROSE 5% IN LACTATED RINGERS PER 1000 ML: Performed by: OBSTETRICS & GYNECOLOGY

## 2024-05-31 PROCEDURE — 93005 ELECTROCARDIOGRAM TRACING: CPT | Performed by: EMERGENCY MEDICINE

## 2024-05-31 PROCEDURE — 59025 FETAL NON-STRESS TEST: CPT

## 2024-05-31 PROCEDURE — 99285 EMERGENCY DEPT VISIT HI MDM: CPT

## 2024-05-31 PROCEDURE — 25010000002 HYDROMORPHONE PER 4 MG: Performed by: EMERGENCY MEDICINE

## 2024-05-31 PROCEDURE — 25010000002 HYDROMORPHONE 1 MG/ML SOLUTION: Performed by: OBSTETRICS & GYNECOLOGY

## 2024-05-31 PROCEDURE — 76705 ECHO EXAM OF ABDOMEN: CPT

## 2024-05-31 PROCEDURE — 81001 URINALYSIS AUTO W/SCOPE: CPT | Performed by: EMERGENCY MEDICINE

## 2024-05-31 PROCEDURE — G0463 HOSPITAL OUTPT CLINIC VISIT: HCPCS

## 2024-05-31 PROCEDURE — 86900 BLOOD TYPING SEROLOGIC ABO: CPT

## 2024-05-31 PROCEDURE — 25010000002 HYDROMORPHONE 1 MG/ML SOLUTION: Performed by: SURGERY

## 2024-05-31 RX ORDER — SODIUM CHLORIDE 9 MG/ML
40 INJECTION, SOLUTION INTRAVENOUS AS NEEDED
Status: DISCONTINUED | OUTPATIENT
Start: 2024-05-31 | End: 2024-06-03 | Stop reason: HOSPADM

## 2024-05-31 RX ORDER — ONDANSETRON 2 MG/ML
4 INJECTION INTRAMUSCULAR; INTRAVENOUS EVERY 8 HOURS PRN
Status: DISCONTINUED | OUTPATIENT
Start: 2024-05-31 | End: 2024-06-01 | Stop reason: SDUPTHER

## 2024-05-31 RX ORDER — HYDROMORPHONE HYDROCHLORIDE 1 MG/ML
0.5 INJECTION, SOLUTION INTRAMUSCULAR; INTRAVENOUS; SUBCUTANEOUS ONCE
Status: COMPLETED | OUTPATIENT
Start: 2024-05-31 | End: 2024-05-31

## 2024-05-31 RX ORDER — SODIUM CHLORIDE 0.9 % (FLUSH) 0.9 %
10 SYRINGE (ML) INJECTION AS NEEDED
Status: DISCONTINUED | OUTPATIENT
Start: 2024-05-31 | End: 2024-06-03 | Stop reason: HOSPADM

## 2024-05-31 RX ORDER — SODIUM CHLORIDE 0.9 % (FLUSH) 0.9 %
10 SYRINGE (ML) INJECTION EVERY 12 HOURS SCHEDULED
Status: DISCONTINUED | OUTPATIENT
Start: 2024-05-31 | End: 2024-06-03 | Stop reason: HOSPADM

## 2024-05-31 RX ORDER — ACETAMINOPHEN 10 MG/ML
1000 INJECTION, SOLUTION INTRAVENOUS ONCE
Status: DISCONTINUED | OUTPATIENT
Start: 2024-05-31 | End: 2024-05-31

## 2024-05-31 RX ORDER — DEXTROSE, SODIUM CHLORIDE, SODIUM LACTATE, POTASSIUM CHLORIDE, AND CALCIUM CHLORIDE 5; .6; .31; .03; .02 G/100ML; G/100ML; G/100ML; G/100ML; G/100ML
125 INJECTION, SOLUTION INTRAVENOUS CONTINUOUS
Status: DISCONTINUED | OUTPATIENT
Start: 2024-05-31 | End: 2024-06-03 | Stop reason: HOSPADM

## 2024-05-31 RX ORDER — ONDANSETRON 2 MG/ML
4 INJECTION INTRAMUSCULAR; INTRAVENOUS ONCE
Status: COMPLETED | OUTPATIENT
Start: 2024-05-31 | End: 2024-05-31

## 2024-05-31 RX ORDER — ONDANSETRON 4 MG/1
8 TABLET, ORALLY DISINTEGRATING ORAL EVERY 8 HOURS PRN
Status: DISCONTINUED | OUTPATIENT
Start: 2024-05-31 | End: 2024-06-01 | Stop reason: SDUPTHER

## 2024-05-31 RX ORDER — LIDOCAINE HYDROCHLORIDE 10 MG/ML
0.5 INJECTION, SOLUTION EPIDURAL; INFILTRATION; INTRACAUDAL; PERINEURAL ONCE AS NEEDED
Status: DISCONTINUED | OUTPATIENT
Start: 2024-05-31 | End: 2024-06-03 | Stop reason: HOSPADM

## 2024-05-31 RX ADMIN — HYDROMORPHONE HYDROCHLORIDE 0.5 MG: 1 INJECTION, SOLUTION INTRAMUSCULAR; INTRAVENOUS; SUBCUTANEOUS at 12:26

## 2024-05-31 RX ADMIN — SODIUM CHLORIDE 1000 ML: 9 INJECTION, SOLUTION INTRAVENOUS at 12:26

## 2024-05-31 RX ADMIN — HYDROMORPHONE HYDROCHLORIDE 0.5 MG: 1 INJECTION, SOLUTION INTRAMUSCULAR; INTRAVENOUS; SUBCUTANEOUS at 13:45

## 2024-05-31 RX ADMIN — HYDROMORPHONE HYDROCHLORIDE 1 MG: 1 INJECTION, SOLUTION INTRAMUSCULAR; INTRAVENOUS; SUBCUTANEOUS at 17:15

## 2024-05-31 RX ADMIN — SODIUM CHLORIDE, SODIUM LACTATE, POTASSIUM CHLORIDE, CALCIUM CHLORIDE AND DEXTROSE MONOHYDRATE 125 ML/HR: 5; 600; 310; 30; 20 INJECTION, SOLUTION INTRAVENOUS at 19:54

## 2024-05-31 RX ADMIN — HYDROMORPHONE HYDROCHLORIDE 1 MG: 1 INJECTION, SOLUTION INTRAMUSCULAR; INTRAVENOUS; SUBCUTANEOUS at 22:13

## 2024-05-31 RX ADMIN — ONDANSETRON 4 MG: 2 INJECTION INTRAMUSCULAR; INTRAVENOUS at 12:26

## 2024-05-31 RX ADMIN — HYDROMORPHONE HYDROCHLORIDE 0.5 MG: 1 INJECTION, SOLUTION INTRAMUSCULAR; INTRAVENOUS; SUBCUTANEOUS at 15:04

## 2024-05-31 RX ADMIN — HYDROMORPHONE HYDROCHLORIDE 1 MG: 1 INJECTION, SOLUTION INTRAMUSCULAR; INTRAVENOUS; SUBCUTANEOUS at 19:51

## 2024-05-31 NOTE — ED PROVIDER NOTES
EMERGENCY DEPARTMENT ENCOUNTER    Pt Name: Karley Elizondo  MRN: [id number]  Pt :   1985  Room Number:    Date of encounter:  2024  PCP: Provider, No Known  ED Provider: Karthikeyan Coelho MD    Historian: Patient and her mother      HPI:  Chief Complaint: Abdominal pain        Context: Karley Elizondo is a 39 y.o. female who presents to the ED c/o right upper quadrant abdominal pain which the patient rates as severe, 9 out of 10 on the pain scale.  She notes that she ate barbecue pork yesterday and thinks that this is likely the reason for the flareup.  She suffered from biliary colic during her last pregnancy and this feels quite similar.  The patient has had severe abdominal pain since 3:30 AM.  She has been nauseated.  She did vomit 1 time early this morning.  No bowel changes.  No urinary changes.  No previous abdominal surgeries.  The patient is G4, P2 at 23 weeks gestation followed by Dr. Oliva of [facility].        PAST MEDICAL HISTORY  History reviewed. No pertinent past medical history.      PAST SURGICAL HISTORY  History reviewed. No pertinent surgical history.      FAMILY HISTORY  History reviewed. No pertinent family history.      SOCIAL HISTORY  Social History     Socioeconomic History    Marital status:          ALLERGIES  Sulfa antibiotics        REVIEW OF SYSTEMS  Review of Systems       All systems reviewed and negative except for those discussed in HPI.       PHYSICAL EXAM    I have reviewed the triage vital signs and nursing notes.    ED Triage Vitals [24 1143]   Temp Heart Rate Resp BP SpO2   98 °F (36.7 °C) 73 16 122/78 99 %      Temp src Heart Rate Source Patient Position BP Location FiO2 (%)   Oral Monitor Sitting Left arm --       Physical Exam  GENERAL:   Appears in significant discomfort  HENT: Nares patent.  Slightly dry mucous membranes  EYES: No scleral icterus.  CV: Regular rhythm, regular rate.  No murmurs gallops rubs  RESPIRATORY: Normal effort.  No  audible wheezes, rales or rhonchi.  Clear to auscultation  ABDOMEN: Soft, significant tenderness to palpation in the right upper quadrant with mild guarding.  Positive David sign.  No lower abdominal tenderness whatsoever.  MUSCULOSKELETAL: No deformities.   NEURO: Alert, moves all extremities, follows commands.  SKIN: Warm, dry, no rash visualized.      LAB RESULTS  Recent Results (from the past 24 hour(s))   Urinalysis With Microscopic If Indicated (No Culture) - Urine, Clean Catch    Collection Time: 05/31/24 12:01 PM    Specimen: Urine, Clean Catch   Result Value Ref Range    Color, UA Yellow Yellow, Straw    Appearance, UA Cloudy (A) Clear    pH, UA 7.0 5.0 - 8.0    Specific Gravity, UA 1.023 1.001 - 1.030    Glucose, UA Negative Negative    Ketones, UA Negative Negative    Bilirubin, UA Negative Negative    Blood, UA Negative Negative    Protein, UA Negative Negative    Leuk Esterase, UA Negative Negative    Nitrite, UA Negative Negative    Urobilinogen, UA 0.2 E.U./dL 0.2 - 1.0 E.U./dL   Urinalysis, Microscopic Only - Urine, Clean Catch    Collection Time: 05/31/24 12:01 PM    Specimen: Urine, Clean Catch   Result Value Ref Range    RBC, UA None Seen None Seen, 0-2 /HPF    WBC, UA None Seen None Seen, 0-2 /HPF    Bacteria, UA None Seen None Seen, Trace /HPF    Squamous Epithelial Cells, UA 7-12 (A) None Seen, 0-2 /HPF    Hyaline Casts, UA None Seen 0 - 6 /LPF    Methodology Manual Light Microscopy    ECG 12 Lead ED Triage Standing Order; Abdominal Pain (Upper)    Collection Time: 05/31/24 12:05 PM   Result Value Ref Range    QT Interval 404 ms    QTC Interval 404 ms   Comprehensive Metabolic Panel    Collection Time: 05/31/24 12:20 PM    Specimen: Blood   Result Value Ref Range    Glucose 104 (H) 65 - 99 mg/dL    BUN 7 6 - 20 mg/dL    Creatinine 0.49 (L) 0.57 - 1.00 mg/dL    Sodium 138 136 - 145 mmol/L    Potassium 3.7 3.5 - 5.2 mmol/L    Chloride 105 98 - 107 mmol/L    CO2 21.0 (L) 22.0 - 29.0 mmol/L     Calcium 8.5 (L) 8.6 - 10.5 mg/dL    Total Protein 6.9 6.0 - 8.5 g/dL    Albumin 3.8 3.5 - 5.2 g/dL    ALT (SGPT) 16 1 - 33 U/L    AST (SGOT) 16 1 - 32 U/L    Alkaline Phosphatase 72 39 - 117 U/L    Total Bilirubin 0.3 0.0 - 1.2 mg/dL    Globulin 3.1 gm/dL    A/G Ratio 1.2 g/dL    BUN/Creatinine Ratio 14.3 7.0 - 25.0    Anion Gap 12.0 5.0 - 15.0 mmol/L    eGFR 123.1 >60.0 mL/min/1.73   Lipase    Collection Time: 05/31/24 12:20 PM    Specimen: Blood   Result Value Ref Range    Lipase 49 13 - 60 U/L   Single High Sensitivity Troponin T    Collection Time: 05/31/24 12:20 PM    Specimen: Blood   Result Value Ref Range    HS Troponin T <6 <14 ng/L   Green Top (Gel)    Collection Time: 05/31/24 12:20 PM   Result Value Ref Range    Extra Tube Hold for add-ons.    Lavender Top    Collection Time: 05/31/24 12:20 PM   Result Value Ref Range    Extra Tube hold for add-on    Gold Top - SST    Collection Time: 05/31/24 12:20 PM   Result Value Ref Range    Extra Tube Hold for add-ons.    Gray Top    Collection Time: 05/31/24 12:20 PM   Result Value Ref Range    Extra Tube Hold for add-ons.    Light Blue Top    Collection Time: 05/31/24 12:20 PM   Result Value Ref Range    Extra Tube Hold for add-ons.    CBC Auto Differential    Collection Time: 05/31/24 12:20 PM    Specimen: Blood   Result Value Ref Range    WBC 11.19 (H) 3.40 - 10.80 10*3/mm3    RBC 3.80 3.77 - 5.28 10*6/mm3    Hemoglobin 12.5 12.0 - 15.9 g/dL    Hematocrit 35.3 34.0 - 46.6 %    MCV 92.9 79.0 - 97.0 fL    MCH 32.9 26.6 - 33.0 pg    MCHC 35.4 31.5 - 35.7 g/dL    RDW 13.0 12.3 - 15.4 %    RDW-SD 43.3 37.0 - 54.0 fl    MPV 9.6 6.0 - 12.0 fL    Platelets 236 140 - 450 10*3/mm3    Neutrophil % 80.0 (H) 42.7 - 76.0 %    Lymphocyte % 13.9 (L) 19.6 - 45.3 %    Monocyte % 3.9 (L) 5.0 - 12.0 %    Eosinophil % 0.6 0.3 - 6.2 %    Basophil % 0.2 0.0 - 1.5 %    Immature Grans % 1.4 (H) 0.0 - 0.5 %    Neutrophils, Absolute 8.95 (H) 1.70 - 7.00 10*3/mm3    Lymphocytes, Absolute  1.55 0.70 - 3.10 10*3/mm3    Monocytes, Absolute 0.44 0.10 - 0.90 10*3/mm3    Eosinophils, Absolute 0.07 0.00 - 0.40 10*3/mm3    Basophils, Absolute 0.02 0.00 - 0.20 10*3/mm3    Immature Grans, Absolute 0.16 (H) 0.00 - 0.05 10*3/mm3    nRBC 0.0 0.0 - 0.2 /100 WBC       If labs were ordered, I independently reviewed the results and considered them in treating the patient.        RADIOLOGY  US Gallbladder    Result Date: 5/31/2024  US GALLBLADDER Date of Exam: 5/31/2024 1:53 PM EDT Indication: upper abd pain with preg at 23 wks.  hx of gall stones. Comparison: No comparisons available. Technique: Grayscale and color Doppler ultrasound evaluation of the right upper quadrant was performed. Findings: The pancreas is normal in size and echogenicity as is the liver. The portal vein and hepatic veins appear patent. There are echogenic stones and sludge layering posteriorly in the gallbladder lumen. The gallbladder is nondilated. The gallbladder wall is mildly thickened although not edematous and there is no pericholecystic fluid. There is a 2 cm shadowing gallstone in the gallbladder neck which does not move. There is no bile duct dilatation.     Impression: Cholelithiasis and sludge without evidence of acute cholecystitis. Electronically Signed: Lizzeth Crowe MD  5/31/2024 2:44 PM EDT  Workstation ID: [id number]     I ordered and independently reviewed the above noted radiographic studies.      I viewed images of right upper quadrant ultrasound which showed cholelithiasis per my independent interpretation.    See radiologist's dictation for official interpretation.        PROCEDURES    Procedures    ECG 12 Lead ED Triage Standing Order; Abdominal Pain (Upper)   Final Result   Test Reason : ED Triage Standing Order~   Blood Pressure :   */*   mmHG   Vent. Rate :  60 BPM     Atrial Rate :  60 BPM      P-R Int : 158 ms          QRS Dur :  78 ms       QT Int : 404 ms       P-R-T Axes :  10  15  47 degrees      QTc Int : 404 ms       Normal sinus rhythm   Cannot rule out Anterior infarct , age undetermined   Abnormal ECG   No previous ECGs available   Confirmed by NIKIA AVILA MD ([id number]) on 5/31/2024 3:13:09 PM      Referred By:            Confirmed By: NIKIA AVILA MD          MEDICATIONS GIVEN IN ER    Medications   sodium chloride 0.9 % flush 10 mL (has no administration in time range)   sodium chloride 0.9 % bolus 1,000 mL (0 mL Intravenous Stopped 5/31/24 1321)   HYDROmorphone (DILAUDID) injection 0.5 mg (0.5 mg Intravenous Given 5/31/24 1226)   ondansetron (ZOFRAN) injection 4 mg (4 mg Intravenous Given 5/31/24 1226)   HYDROmorphone (DILAUDID) injection 0.5 mg (0.5 mg Intravenous Given 5/31/24 1345)   HYDROmorphone (DILAUDID) injection 0.5 mg (0.5 mg Intravenous Given 5/31/24 1504)         MEDICAL DECISION MAKING, PROGRESS, and CONSULTS    All labs, if obtained, have been independently reviewed by me.  All radiology studies, if obtained, have been reviewed by me and the radiologist dictating the report.  All EKG's, if obtained, have been independently viewed and interpreted by me/my attending physician.      Discussion below represents my analysis of pertinent findings related to patient's condition, differential diagnosis, treatment plan and final disposition.                         Differential diagnosis:    Biliary colic versus cholecystitis etc.  Complicated by pregnancy.      Additional sources:    - Discussed/ obtained information from independent historians: Spoke with patient's mother at bedside.    - External (non-ED) record review: I reviewed OB related notes through the [facility].    - Chronic or social conditions impacting care: .    - Shared decision making: Patient is in full agreement with current plans for evaluation treatment and admission.      Orders placed during this visit:  Orders Placed This Encounter   Procedures    US Gallbladder    [place] Draw    Comprehensive Metabolic Panel     Lipase    Single High Sensitivity Troponin T    Urinalysis With Microscopic If Indicated (No Culture) - Urine, Clean Catch    CBC Auto Differential    Urinalysis, Microscopic Only - Urine, Clean Catch    NPO Diet NPO Type: Strict NPO    Undress & Gown    Continuous Pulse Oximetry    Vital Signs    Monitor Fetal Heart Tones    Oxygen Therapy- Nasal Cannula; Titrate 1-6 LPM Per SpO2; 90 - 95%    ECG 12 Lead ED Triage Standing Order; Abdominal Pain (Upper)    Insert Peripheral IV    Med / Surg Bed Request (CLARA / [facility]ER[facility] / [facility] ONLY)    CBC & Differential    Green Top (Gel)    Lavender Top    Gold Top - SST    Gray Top    Light Blue Top         Additional orders considered but not ordered:  CT scan abdomen pelvis    ED Course:    Consultants: Dr. Magdaleno of general surgery and Dr. Greta Lin of OB/GYN    ED Course as of 05/31/24 1528   Fri May 31, 2024   1459 The patient is receiving her third dose of Dilaudid.  Currently her pain level is 10 out of 10 on the pain scale.  Prior to ultrasound it was 5-6 out of 10.  She does not seem to be tuning up quickly and I will seek admission. [MS]   1505 I spoke with Dr. Greta Lin, on-call OB/GYN.  She is uncertain whether this patient meets criteria for admission to the GYN service or the the hospitalist service.  Of contacted the house supervisor and he is researching. [MS]   1501 I spoke with Dr. Magdaleno, general surgery.  He will evaluate the patient in the emergency department shortly. [MS]   1525 Dr. Stone is now in the emergency department evaluating the patient.  I spoke with Dr. Lin again and she will admit. [MS]      ED Course User Index  [MS] Karthikeyan Coelho MD              Shared Decision Making:  After my consideration of clinical presentation and any laboratory/radiology studies obtained, I discussed the findings with the patient/patient representative who is in agreement with the treatment plan and the final disposition.   Risks and benefits of discharge  and/or observation/admission were discussed.       AS OF 15:28 EDT VITALS:    BP - 120/71  HR - 53  TEMP - 98 °F (36.7 °C) (Oral)  O2 SATS - 100%                  DIAGNOSIS  Final diagnoses:   Biliary colic   Intractable abdominal pain   23 weeks gestation of pregnancy         DISPOSITION  Admission      Please note that portions of this document were completed with voice recognition software.        Karthikeyan Coelho MD  05/31/24 1924

## 2024-05-31 NOTE — CONSULTS
Patient Name:  Karley Elizondo  YOB: 1985  [id number]       Patient Care Team:  Provider, No Known as PCP - General      General Surgery Consult Note     Date of Consultation: 05/31/24    Consulting Physician : Karthikeyan Coelho MD    Reason for Consult : Abdominal pain    Subjective     I have been asked to see  Karley Elizondo , a 39 y.o. female in consultation for abdominal pain.  She is a G3, P2 female currently in her 23rd week of gestation who presents with a 12-hour history of abdominal pain.  She describes this pain is primarily located in the epigastrium and right upper quadrant.  It was associated with nausea and vomiting of nonbilious nonbloody material.  She denies any associated fevers or chills.  She states that this is her third attack in the last week and a half.  She has a known history of gallstones and in fact had similar attacks during her previous pregnancy, and had seen a surgeon after delivery of her second child, but never had her gallbladder removed.  She was visiting her family here and developed pain last night at 3:30 in the morning.  She presented to our emergency department subsequent evaluation demonstrated gallstones without evidence of acute inflammation.  She is being admitted to the OB service, we have been asked to see her for possible surgical management of her gallbladder.  Currently she is feeling a little better but still somewhat nauseated.  No other current complaints.      Allergy:   Allergies   Allergen Reactions    Sulfa Antibiotics Rash       Medications:    Prenatal vitamin      PMHx:   History reviewed. No pertinent past medical history.    Past Surgical History:  D&C    Family History: Noncontributory     Social History: Pt lives in [facility].  She is an    Tobacco use: Denies     EtOH use : Denies    Illicit drug use: Denies      Review of Systems        Constitutional: No fevers, chills or malaise   Eyes: Denies visual changes  "   Cardiovascular: Denies chest pain, palpitations   Pulmonary: Denies cough or shortness of breath   Abdominal/ GI: See HPI    Genitourinary: Denies dysuria or hematuria   Musculoskeletal: Denies any but chronic joint aches, pains or deformities   Psychiatric: No recent mood changes   Neurologic: No paresthesias or loss of function          Objective     Physical Exam:      Vital Signs  /76   Pulse 56   Temp 98 °F (36.7 °C) (Oral)   Resp 16   Ht 170.2 cm (67\")   Wt 81.2 kg (179 lb)   SpO2 99%   BMI 28.04 kg/m²   No intake or output data in the 24 hours ending 05/31/24 4897      Physical Exam:    Head: Normocephalic, atraumatic.   Eyes: Pupils equal, round, react to light and accommodation.   Mouth: Oral mucosa without lesions,   Neck: No masses, lymphadenopathy or carotid bruits bilaterally   CV: Rhythm  and rate regular , no  murmurs, rubs or gallops  Lungs: Clear  to auscultation bilaterally   Abdomen: Bowel sounds positive  , soft, mildly tender RUQ  Groin : No obvious hernias bilaterally   Extremities:  No cyanosis, clubbing or edema bilaterally   Lymphatics: No abnormal lymphadenopathy appreciated   Neurologic: No gross deficits       Results Review: I have personally reviewed all of the recent lab and imaging results available at this time.  Laboratory exam reveals a white count of 11,000 with a hemoglobin of 12.5 and a platelet count of 236.  Her lipase is 49 her comprehensive metabolic panel is significant for a glucose of 104 with a calcium of 8.5.  Her transaminases are normal at 16 and 16 respectively, her albumin is 3.8, her alkaline phosphatase is 72 and her total bilirubin is 0.3.    Ultrasound the gallbladder was personally viewed by me as well as the final dictated and edited report.  This demonstrates cholelithiasis with some sludge without evidence of acute cholecystitis.  The common bile duct is of normal caliber.  There is no evidence of biliary obstruction.  No pericholecystic " fluid noted.           Assessment and Plan:      Visit Diagnoses       Biliary colic    -  Primary- She has evidence of biliary colic without evidence of acute cholecystitis.  As this has been her third attack and she is being admitted, I discussed with her the possibility of elective laparoscopic cholecystectomy tomorrow given that she is in her second trimester, and this would be the safest time to proceed with operation.  Alternatively, if her symptoms resolve, we can always plan for outpatient cholecystectomy either after she delivers or in the next couple of weeks.  I have discussed the risks and benefits of each approach, including the risk of fetal demise, and currently she is agreeable to proceed with laparoscopic cholecystectomy tomorrow.  We will tentatively plan for this tomorrow morning, and if her symptoms improve overnight and she wishes to be discharged home for follow-up with her home surgeon, that would be fine with me as well.  I will continue to follow this patient with you while she is in the hospital.    Intractable abdominal pain        23 weeks gestation of pregnancy                 There are no hospital problems to display for this patient.           I discussed the patient's findings and my recommendations with the patient and/or family, as well as the primary team     Herberth Magdaleno MD  05/31/24  15:47 EDT        Dictated using Dragon Dictation

## 2024-05-31 NOTE — H&P
"[facility]  Obstetric History and Physical    Referring Provider: Veronique Herr   ([facility])      Chief Complaint   Patient presents with    Abdominal Pain       Subjective     Patient is a 39 y.o. female  currently at 23w2d, who presents through the ED with biliary colic.  Patient seen by Dr. Magdaleno and planning a elective cholecystectomy in the morning.  Patient presented to the ED after 12 hours history of abdominal pain and nausea and vomiting.  Patient with known history of gallstones with prior attacks with previous pregnancies.  Initial prenatal care in [facility].   course complicated by short interval between pregnancies.  2 term vaginal deliveries without complications.  Patient has leaking fluid, vaginal bleeding, recent trauma, fever, or urinary symptoms.  Maternal blood type Rh+.        The following portions of the patients history were reviewed and updated as appropriate: current medications, allergies, past medical history, past surgical history, past family history, past social history, and problem list .       Prenatal Information:   Maternal Prenatal Labs  Blood Type No results found for: \"ABO\"   Rh Status No results found for: \"RH\"   Antibody Screen No results found for: \"ABSCRN\"   Gonnorhea No results found for: \"GCCX\"   Chlamydia No results found for: \"CLAMYDCU\"   RPR No results found for: \"RPR\"   Syphilis Antibody No results found for: \"SYPHILIS\"   Rubella No results found for: \"RUBELLAIGGIN\"   Hepatitis B Surface Antigen No results found for: \"HEPBSAG\"   HIV-1 Antibody No results found for: \"LABHIV1\"   Hepatitis C Antibody No results found for: \"HEPCAB\"   Rapid Urin Drug Screen No results found for: \"AMPMETHU\", \"BARBITSCNUR\", \"LABBENZSCN\", \"LABMETHSCN\", \"LABOPIASCN\", \"THCURSCR\", \"COCAINEUR\", \"AMPHETSCREEN\", \"PROPOXSCN\", \"BUPRENORSCNU\", \"METAMPSCNUR\", \"OXYCODONESCN\", \"TRICYCLICSCN\"   Group B Strep Culture No results found for: \"GBSANTIGEN\"           External Prenatal " Results       Pregnancy Outside Results - Transcribed From Office Records - See Scanned Records For Details       Test Value Date Time    ABO ^ A  22     Rh       Antibody Screen       Varicella IgG       Rubella       Hgb  12.5 g/dL 24 1220    Hct  35.3 % 24 1220    Glucose Fasting GTT       Glucose Tolerance Test 1 hour       Glucose Tolerance Test 3 hour       Gonorrhea (discrete)       Chlamydia (discrete)       RPR       VDRL       Syphilis Antibody       HBsAg       Herpes Simplex Virus PCR       Herpes Simplex VIrus Culture       HIV       Hep C RNA Quant PCR       Hep C Antibody       AFP ^ 64 ng/mL 24 1010    Group B Strep       GBS Susceptibility to Clindamycin       GBS Susceptibility to Erythromycin       Fetal Fibronectin       Genetic Testing, Maternal Blood                 Drug Screening       Test Value Date Time    NIPT        Urine Drug Screen       Amphetamine Screen       Barbiturate Screen       Benzodiazepine Screen       Methadone Screen       Phencyclidine Screen       Opiates Screen       THC Screen       Cocaine Screen       Propoxyphene Screen       Buprenorphine Screen       Methamphetamine Screen       Oxycodone Screen                 Legend    ^: Historical                              Past OB History:       OB History    Para Term  AB Living   4 2 2 0 1 2   SAB IAB Ectopic Molar Multiple Live Births   1 0 0 0 0 2      # Outcome Date GA Lbr Lobito/2nd Weight Sex Type Anes PTL Lv   4 Current            3 Term 2023        LEXI   2 SAB            1 Term         LEXI       Past Medical History: History reviewed. No pertinent past medical history.   Past Surgical History Past Surgical History:   Procedure Laterality Date    DILATATION AND CURETTAGE        Family History: History reviewed. No pertinent family history.   Social History:  has no history on file for tobacco use.   has no history on file for alcohol use.   has no history on file for  drug use.                   General ROS Negative Findings:Headaches, Visual Changes, Anorexia, ROM, and Vaginal Bleeding    ROS     All other systems have been reviewed and are neg  Objective       Vital Signs Range for the last 24 hours  Temperature: Temp:  [98 °F (36.7 °C)-98.6 °F (37 °C)] 98.6 °F (37 °C)   Temp Source: Temp src: Oral   BP: BP: (110-122)/(68-78) 113/68   Pulse: Heart Rate:  [51-73] 65   Respirations: Resp:  [16] 16   SPO2: SpO2:  [91 %-100 %] 91 %   O2 Amount (l/min):     O2 Devices     Weight: Weight:  [81.2 kg (179 lb)] 81.2 kg (179 lb)     Physical Examination:   General:   alert, appears stated age, and cooperative   Skin:   normal   HEENT:     Lungs:   clear to auscultation bilaterally   Heart:   regular rate and rhythm, S1, S2 normal, no murmur, click, rub or gallop   Gastrointestinal: Abdomen soft mildly tender right upper quadrant, no guarding uterus nontender.   Lower Extremities: No edema, no calf tenderness   : Exam deferred.   Musculoskeletal:     Neuro: No focal deficits noted             Fetal Heart Rate Assessment   Method:     Beats/min:     Baseline:  145   Varibility:     Accels:     Decels:     Tracing Category:       Uterine Assessment   Method:     Frequency (min):     Ctx Count in 10 min:     Duration:     Intensity:     Intensity by IUPC:     Resting Tone:     Resting Tone by IUPC:     [place] Units:       Laboratory Results:   Lab Results (last 24 hours)       Procedure Component Value Units Date/Time    Single High Sensitivity Troponin T [[id number]]  (Normal) Collected: 05/31/24 1220    Specimen: Blood Updated: 05/31/24 1317     HS Troponin T <6 ng/L     Narrative:      High Sensitive Troponin T Reference Range:  <14.0 ng/L- Negative Female for AMI  <22.0 ng/L- Negative Male for AMI  >=14 - Abnormal Female indicating possible myocardial injury.  >=22 - Abnormal Male indicating possible myocardial injury.   Clinicians would have to utilize clinical acumen, EKG,  Troponin, and serial changes to determine if it is an Acute Myocardial Infarction or myocardial injury due to an underlying chronic condition.         Comprehensive Metabolic Panel [[id number]]  (Abnormal) Collected: 05/31/24 1220    Specimen: Blood Updated: 05/31/24 1312     Glucose 104 mg/dL      BUN 7 mg/dL      Creatinine 0.49 mg/dL      Sodium 138 mmol/L      Potassium 3.7 mmol/L      Chloride 105 mmol/L      CO2 21.0 mmol/L      Calcium 8.5 mg/dL      Total Protein 6.9 g/dL      Albumin 3.8 g/dL      ALT (SGPT) 16 U/L      AST (SGOT) 16 U/L      Alkaline Phosphatase 72 U/L      Total Bilirubin 0.3 mg/dL      Globulin 3.1 gm/dL      Comment: Calculated Result        A/G Ratio 1.2 g/dL      BUN/Creatinine Ratio 14.3     Anion Gap 12.0 mmol/L      eGFR 123.1 mL/min/1.73     Narrative:      GFR Normal >60  Chronic Kidney Disease <60  Kidney Failure <15      Lipase [[id number]]  (Normal) Collected: 05/31/24 1220    Specimen: Blood Updated: 05/31/24 1259     Lipase 49 U/L     CBC & Differential [[id number]]  (Abnormal) Collected: 05/31/24 1220    Specimen: Blood Updated: 05/31/24 1231    Narrative:      The following orders were created for panel order CBC & Differential.  Procedure                               Abnormality         Status                     ---------                               -----------         ------                     CBC Auto Differential[[id number]]        Abnormal            Final result                 Please view results for these tests on the individual orders.    CBC Auto Differential [[id number]]  (Abnormal) Collected: 05/31/24 1220    Specimen: Blood Updated: 05/31/24 1231     WBC 11.19 10*3/mm3      RBC 3.80 10*6/mm3      Hemoglobin 12.5 g/dL      Hematocrit 35.3 %      MCV 92.9 fL      MCH 32.9 pg      MCHC 35.4 g/dL      RDW 13.0 %      RDW-SD 43.3 fl      MPV 9.6 fL      Platelets 236 10*3/mm3      Neutrophil % 80.0 %      Lymphocyte % 13.9 %      Monocyte % 3.9 %      Eosinophil %  0.6 %      Basophil % 0.2 %      Immature Grans % 1.4 %      Neutrophils, Absolute 8.95 10*3/mm3      Lymphocytes, Absolute 1.55 10*3/mm3      Monocytes, Absolute 0.44 10*3/mm3      Eosinophils, Absolute 0.07 10*3/mm3      Basophils, Absolute 0.02 10*3/mm3      Immature Grans, Absolute 0.16 10*3/mm3      nRBC 0.0 /100 WBC     [place] Draw [[id number]] Collected: 05/31/24 1220    Specimen: Blood Updated: 05/31/24 1231    Narrative:      The following orders were created for panel order [place] Draw.  Procedure                               Abnormality         Status                     ---------                               -----------         ------                     Green Top (Gel)[[id number]]                                  Final result               Lavender Top[[id number]]                                     Final result               Gold Top - SST[[id number]]                                   Final result               [place] Top[[id number]]                                         Final result               Light Blue Top[[id number]]                                   Final result                 Please view results for these tests on the individual orders.    Green Top (Gel) [[id number]] Collected: 05/31/24 1220    Specimen: Blood Updated: 05/31/24 1231     Extra Tube Hold for add-ons.     Comment: Auto resulted.       Lavender Top [[id number]] Collected: 05/31/24 1220    Specimen: Blood Updated: 05/31/24 1231     Extra Tube hold for add-on     Comment: Auto resulted       Gold Top - SST [[id number]] Collected: 05/31/24 1220    Specimen: Blood Updated: 05/31/24 1231     Extra Tube Hold for add-ons.     Comment: Auto resulted.       Gray Top [[id number]] Collected: 05/31/24 1220    Specimen: Blood Updated: 05/31/24 1231     Extra Tube Hold for add-ons.     Comment: Auto resulted.       Light Blue Top [[id number]] Collected: 05/31/24 1220    Specimen: Blood Updated: 05/31/24 1231     Extra Tube Hold for add-ons.      Comment: Auto resulted       Urinalysis, Microscopic Only - Urine, Clean Catch [[id number]]  (Abnormal) Collected: 05/31/24 1201    Specimen: Urine, Clean Catch Updated: 05/31/24 1226     RBC, UA None Seen /HPF      WBC, UA None Seen /HPF      Bacteria, UA None Seen /HPF      Squamous Epithelial Cells, UA 7-12 /HPF      Hyaline Casts, UA None Seen /LPF      Methodology Manual Light Microscopy    Urinalysis With Microscopic If Indicated (No Culture) - Urine, Clean Catch [[id number]]  (Abnormal) Collected: 05/31/24 1201    Specimen: Urine, Clean Catch Updated: 05/31/24 1217     Color, UA Yellow     Appearance, UA Cloudy     pH, UA 7.0     Specific Gravity, UA 1.023     Glucose, UA Negative     Ketones, UA Negative     Bilirubin, UA Negative     Blood, UA Negative     Protein, UA Negative     Leuk Esterase, UA Negative     Nitrite, UA Negative     Urobilinogen, UA 0.2 E.U./dL          Radiology Review:   Imaging Results (Last 24 Hours)       Procedure Component Value Units Date/Time    US Gallbladder [[id number]] Collected: 05/31/24 1442     Updated: 05/31/24 1447    Narrative:      US GALLBLADDER    Date of Exam: 5/31/2024 1:53 PM EDT    Indication: upper abd pain with preg at 23 wks.  hx of gall stones.    Comparison: No comparisons available.    Technique: Grayscale and color Doppler ultrasound evaluation of the right upper quadrant was performed.      Findings:  The pancreas is normal in size and echogenicity as is the liver. The portal vein and hepatic veins appear patent. There are echogenic stones and sludge layering posteriorly in the gallbladder lumen. The gallbladder is nondilated. The gallbladder wall is   mildly thickened although not edematous and there is no pericholecystic fluid. There is a 2 cm shadowing gallstone in the gallbladder neck which does not move. There is no bile duct dilatation.      Impression:      Impression:  Cholelithiasis and sludge without evidence of acute  cholecystitis.        Electronically Signed: Lizzeth Crowe MD    2024 2:44 PM EDT    Workstation ID: [id number]          Other Studies:    Assessment & Plan       Biliary colic        Assessment:  1.  Intrauterine pregnancy at 23w2d weeks gestation with reassuring fetal status.    2.  Biliary colic (third attack during current pregnancy)  3.  History of 2 term vaginal deliveries  4.  Short interval between pregnancy  5.  Maternal blood type  A positive  Plan:  1.  Admit to antepartum unit, toco as needed pre procedure and continuous post surgery.  Fetal heart tones every shift (would not obtain continuous fetal monitoring less than 24 weeks gestation), regular uterine contractions noted or patient perceives regular contractions will give 48 hours of Indocin  with close observation for  labor  2. Plan of care has been reviewed with patient.  3.  Risks, benefits of treatment plan have been discussed.  4.  All questions have been answered.  5      Ponce Olmos DO  2024  17:43 EDT

## 2024-06-01 ENCOUNTER — ANESTHESIA (OUTPATIENT)
Dept: PERIOP | Facility: HOSPITAL | Age: 39
End: 2024-06-01
Payer: COMMERCIAL

## 2024-06-01 PROBLEM — K80.00 ACUTE CHOLECYSTITIS DUE TO BILIARY CALCULUS: Status: ACTIVE | Noted: 2024-05-31

## 2024-06-01 PROBLEM — Z34.90 INTRAUTERINE PREGNANCY: Status: ACTIVE | Noted: 2024-06-01

## 2024-06-01 LAB
ABO GROUP BLD: NORMAL
ABO GROUP BLD: NORMAL
ALBUMIN SERPL-MCNC: 3.1 G/DL (ref 3.5–5.2)
ALBUMIN/GLOB SERPL: 1.2 G/DL
ALP SERPL-CCNC: 62 U/L (ref 39–117)
ALT SERPL W P-5'-P-CCNC: 12 U/L (ref 1–33)
ANION GAP SERPL CALCULATED.3IONS-SCNC: 11 MMOL/L (ref 5–15)
AST SERPL-CCNC: 11 U/L (ref 1–32)
BILIRUB SERPL-MCNC: 0.4 MG/DL (ref 0–1.2)
BLD GP AB SCN SERPL QL: NEGATIVE
BUN SERPL-MCNC: 3 MG/DL (ref 6–20)
BUN/CREAT SERPL: 6.8 (ref 7–25)
CALCIUM SPEC-SCNC: 7.8 MG/DL (ref 8.6–10.5)
CHLORIDE SERPL-SCNC: 102 MMOL/L (ref 98–107)
CO2 SERPL-SCNC: 22 MMOL/L (ref 22–29)
CREAT SERPL-MCNC: 0.44 MG/DL (ref 0.57–1)
DEPRECATED RDW RBC AUTO: 43.8 FL (ref 37–54)
EGFRCR SERPLBLD CKD-EPI 2021: 126.4 ML/MIN/1.73
ERYTHROCYTE [DISTWIDTH] IN BLOOD BY AUTOMATED COUNT: 13 % (ref 12.3–15.4)
GLOBULIN UR ELPH-MCNC: 2.6 GM/DL
GLUCOSE SERPL-MCNC: 127 MG/DL (ref 65–99)
HCT VFR BLD AUTO: 32.3 % (ref 34–46.6)
HGB BLD-MCNC: 11.3 G/DL (ref 12–15.9)
MCH RBC QN AUTO: 32.7 PG (ref 26.6–33)
MCHC RBC AUTO-ENTMCNC: 35 G/DL (ref 31.5–35.7)
MCV RBC AUTO: 93.4 FL (ref 79–97)
PLATELET # BLD AUTO: 229 10*3/MM3 (ref 140–450)
PMV BLD AUTO: 10.1 FL (ref 6–12)
POTASSIUM SERPL-SCNC: 3.3 MMOL/L (ref 3.5–5.2)
PROT SERPL-MCNC: 5.7 G/DL (ref 6–8.5)
RBC # BLD AUTO: 3.46 10*6/MM3 (ref 3.77–5.28)
RH BLD: POSITIVE
RH BLD: POSITIVE
SODIUM SERPL-SCNC: 135 MMOL/L (ref 136–145)
T&S EXPIRATION DATE: NORMAL
WBC NRBC COR # BLD AUTO: 10.21 10*3/MM3 (ref 3.4–10.8)

## 2024-06-01 PROCEDURE — 88304 TISSUE EXAM BY PATHOLOGIST: CPT | Performed by: SURGERY

## 2024-06-01 PROCEDURE — 25010000002 PHENYLEPHRINE 10 MG/ML SOLUTION: Performed by: NURSE ANESTHETIST, CERTIFIED REGISTERED

## 2024-06-01 PROCEDURE — 0FT44ZZ RESECTION OF GALLBLADDER, PERCUTANEOUS ENDOSCOPIC APPROACH: ICD-10-PCS | Performed by: SURGERY

## 2024-06-01 PROCEDURE — 25010000002 PROPOFOL 10 MG/ML EMULSION: Performed by: NURSE ANESTHETIST, CERTIFIED REGISTERED

## 2024-06-01 PROCEDURE — 25010000002 CEFAZOLIN PER 500 MG: Performed by: SURGERY

## 2024-06-01 PROCEDURE — 25810000003 SODIUM CHLORIDE PER 500 ML: Performed by: SURGERY

## 2024-06-01 PROCEDURE — 25810000003 LACTATED RINGERS PER 1000 ML: Performed by: NURSE ANESTHETIST, CERTIFIED REGISTERED

## 2024-06-01 PROCEDURE — 25010000002 CEFAZOLIN PER 500 MG: Performed by: NURSE ANESTHETIST, CERTIFIED REGISTERED

## 2024-06-01 PROCEDURE — 25010000002 DEXAMETHASONE PER 1 MG: Performed by: NURSE ANESTHETIST, CERTIFIED REGISTERED

## 2024-06-01 PROCEDURE — 86901 BLOOD TYPING SEROLOGIC RH(D): CPT | Performed by: OBSTETRICS & GYNECOLOGY

## 2024-06-01 PROCEDURE — 25810000003 LACTATED RINGERS PER 1000 ML: Performed by: SURGERY

## 2024-06-01 PROCEDURE — 25010000002 FENTANYL CITRATE (PF) 50 MCG/ML SOLUTION: Performed by: NURSE ANESTHETIST, CERTIFIED REGISTERED

## 2024-06-01 PROCEDURE — 25010000002 NEOSTIGMINE 10 MG/10ML SOLUTION: Performed by: NURSE ANESTHETIST, CERTIFIED REGISTERED

## 2024-06-01 PROCEDURE — 25010000002 GLYCOPYRROLATE 1 MG/5ML SOLUTION: Performed by: NURSE ANESTHETIST, CERTIFIED REGISTERED

## 2024-06-01 PROCEDURE — 85027 COMPLETE CBC AUTOMATED: CPT | Performed by: SURGERY

## 2024-06-01 PROCEDURE — 25010000002 SUCCINYLCHOLINE PER 20 MG: Performed by: NURSE ANESTHETIST, CERTIFIED REGISTERED

## 2024-06-01 PROCEDURE — 86850 RBC ANTIBODY SCREEN: CPT | Performed by: OBSTETRICS & GYNECOLOGY

## 2024-06-01 PROCEDURE — 86900 BLOOD TYPING SEROLOGIC ABO: CPT | Performed by: OBSTETRICS & GYNECOLOGY

## 2024-06-01 PROCEDURE — 80053 COMPREHEN METABOLIC PANEL: CPT | Performed by: SURGERY

## 2024-06-01 DEVICE — LIGACLIP 10-M/L, 10MM ENDOSCOPIC ROTATING MULTIPLE CLIP APPLIERS
Type: IMPLANTABLE DEVICE | Site: ABDOMEN | Status: FUNCTIONAL
Brand: LIGACLIP

## 2024-06-01 RX ORDER — GLYCOPYRROLATE 0.2 MG/ML
INJECTION INTRAMUSCULAR; INTRAVENOUS AS NEEDED
Status: DISCONTINUED | OUTPATIENT
Start: 2024-06-01 | End: 2024-06-01 | Stop reason: SURG

## 2024-06-01 RX ORDER — SODIUM CHLORIDE 9 MG/ML
INJECTION, SOLUTION INTRAVENOUS AS NEEDED
Status: DISCONTINUED | OUTPATIENT
Start: 2024-06-01 | End: 2024-06-01 | Stop reason: HOSPADM

## 2024-06-01 RX ORDER — INDOMETHACIN 25 MG/1
25 CAPSULE ORAL EVERY 6 HOURS
Status: DISCONTINUED | OUTPATIENT
Start: 2024-06-01 | End: 2024-06-03 | Stop reason: HOSPADM

## 2024-06-01 RX ORDER — ONDANSETRON 2 MG/ML
4 INJECTION INTRAMUSCULAR; INTRAVENOUS EVERY 6 HOURS PRN
Status: DISCONTINUED | OUTPATIENT
Start: 2024-06-01 | End: 2024-06-03 | Stop reason: HOSPADM

## 2024-06-01 RX ORDER — PHENYLEPHRINE HYDROCHLORIDE 10 MG/ML
INJECTION INTRAVENOUS AS NEEDED
Status: DISCONTINUED | OUTPATIENT
Start: 2024-06-01 | End: 2024-06-01 | Stop reason: SURG

## 2024-06-01 RX ORDER — ONDANSETRON 4 MG/1
4 TABLET, ORALLY DISINTEGRATING ORAL EVERY 6 HOURS PRN
Status: DISCONTINUED | OUTPATIENT
Start: 2024-06-01 | End: 2024-06-03 | Stop reason: HOSPADM

## 2024-06-01 RX ORDER — ROCURONIUM BROMIDE 10 MG/ML
INJECTION, SOLUTION INTRAVENOUS AS NEEDED
Status: DISCONTINUED | OUTPATIENT
Start: 2024-06-01 | End: 2024-06-01 | Stop reason: SURG

## 2024-06-01 RX ORDER — NEOSTIGMINE METHYLSULFATE 1 MG/ML
INJECTION, SOLUTION INTRAVENOUS AS NEEDED
Status: DISCONTINUED | OUTPATIENT
Start: 2024-06-01 | End: 2024-06-01 | Stop reason: SURG

## 2024-06-01 RX ORDER — FENTANYL CITRATE 50 UG/ML
INJECTION, SOLUTION INTRAMUSCULAR; INTRAVENOUS AS NEEDED
Status: DISCONTINUED | OUTPATIENT
Start: 2024-06-01 | End: 2024-06-01 | Stop reason: SURG

## 2024-06-01 RX ORDER — PROMETHAZINE HYDROCHLORIDE 12.5 MG/1
12.5 TABLET ORAL EVERY 6 HOURS PRN
Status: DISCONTINUED | OUTPATIENT
Start: 2024-06-01 | End: 2024-06-03 | Stop reason: HOSPADM

## 2024-06-01 RX ORDER — DEXAMETHASONE SODIUM PHOSPHATE 4 MG/ML
INJECTION, SOLUTION INTRA-ARTICULAR; INTRALESIONAL; INTRAMUSCULAR; INTRAVENOUS; SOFT TISSUE AS NEEDED
Status: DISCONTINUED | OUTPATIENT
Start: 2024-06-01 | End: 2024-06-01 | Stop reason: SURG

## 2024-06-01 RX ORDER — HYDROMORPHONE HYDROCHLORIDE 1 MG/ML
0.5 INJECTION, SOLUTION INTRAMUSCULAR; INTRAVENOUS; SUBCUTANEOUS
Status: DISCONTINUED | OUTPATIENT
Start: 2024-06-01 | End: 2024-06-03 | Stop reason: HOSPADM

## 2024-06-01 RX ORDER — CEFAZOLIN SODIUM 1 G/3ML
INJECTION, POWDER, FOR SOLUTION INTRAMUSCULAR; INTRAVENOUS AS NEEDED
Status: DISCONTINUED | OUTPATIENT
Start: 2024-06-01 | End: 2024-06-01 | Stop reason: SURG

## 2024-06-01 RX ORDER — IPRATROPIUM BROMIDE AND ALBUTEROL SULFATE 2.5; .5 MG/3ML; MG/3ML
3 SOLUTION RESPIRATORY (INHALATION) ONCE AS NEEDED
Status: DISCONTINUED | OUTPATIENT
Start: 2024-06-01 | End: 2024-06-01 | Stop reason: HOSPADM

## 2024-06-01 RX ORDER — FENTANYL CITRATE 50 UG/ML
50 INJECTION, SOLUTION INTRAMUSCULAR; INTRAVENOUS
Status: DISCONTINUED | OUTPATIENT
Start: 2024-06-01 | End: 2024-06-01 | Stop reason: HOSPADM

## 2024-06-01 RX ORDER — HYDROMORPHONE HYDROCHLORIDE 1 MG/ML
0.5 INJECTION, SOLUTION INTRAMUSCULAR; INTRAVENOUS; SUBCUTANEOUS
Status: DISCONTINUED | OUTPATIENT
Start: 2024-06-01 | End: 2024-06-01 | Stop reason: HOSPADM

## 2024-06-01 RX ORDER — PROPOFOL 10 MG/ML
VIAL (ML) INTRAVENOUS AS NEEDED
Status: DISCONTINUED | OUTPATIENT
Start: 2024-06-01 | End: 2024-06-01 | Stop reason: SURG

## 2024-06-01 RX ORDER — SUCCINYLCHOLINE CHLORIDE 20 MG/ML
INJECTION INTRAMUSCULAR; INTRAVENOUS AS NEEDED
Status: DISCONTINUED | OUTPATIENT
Start: 2024-06-01 | End: 2024-06-01 | Stop reason: SURG

## 2024-06-01 RX ORDER — BUPIVACAINE HYDROCHLORIDE AND EPINEPHRINE 2.5; 5 MG/ML; UG/ML
INJECTION, SOLUTION EPIDURAL; INFILTRATION; INTRACAUDAL; PERINEURAL AS NEEDED
Status: DISCONTINUED | OUTPATIENT
Start: 2024-06-01 | End: 2024-06-01 | Stop reason: HOSPADM

## 2024-06-01 RX ORDER — BISACODYL 5 MG/1
10 TABLET, DELAYED RELEASE ORAL DAILY
Status: DISCONTINUED | OUTPATIENT
Start: 2024-06-01 | End: 2024-06-03 | Stop reason: HOSPADM

## 2024-06-01 RX ORDER — SODIUM CHLORIDE, SODIUM LACTATE, POTASSIUM CHLORIDE, CALCIUM CHLORIDE 600; 310; 30; 20 MG/100ML; MG/100ML; MG/100ML; MG/100ML
100 INJECTION, SOLUTION INTRAVENOUS CONTINUOUS
Status: DISCONTINUED | OUTPATIENT
Start: 2024-06-01 | End: 2024-06-03 | Stop reason: HOSPADM

## 2024-06-01 RX ORDER — NALOXONE HCL 0.4 MG/ML
0.1 VIAL (ML) INJECTION
Status: DISCONTINUED | OUTPATIENT
Start: 2024-06-01 | End: 2024-06-03 | Stop reason: HOSPADM

## 2024-06-01 RX ORDER — DOCUSATE SODIUM 100 MG/1
100 CAPSULE, LIQUID FILLED ORAL 2 TIMES DAILY
Status: DISCONTINUED | OUTPATIENT
Start: 2024-06-01 | End: 2024-06-03 | Stop reason: HOSPADM

## 2024-06-01 RX ORDER — OXYCODONE HYDROCHLORIDE AND ACETAMINOPHEN 5; 325 MG/1; MG/1
2 TABLET ORAL EVERY 4 HOURS PRN
Status: DISCONTINUED | OUTPATIENT
Start: 2024-06-01 | End: 2024-06-03 | Stop reason: HOSPADM

## 2024-06-01 RX ORDER — INDOMETHACIN 25 MG/1
50 CAPSULE ORAL ONCE
Status: COMPLETED | OUTPATIENT
Start: 2024-06-01 | End: 2024-06-01

## 2024-06-01 RX ORDER — LIDOCAINE HYDROCHLORIDE 10 MG/ML
INJECTION, SOLUTION EPIDURAL; INFILTRATION; INTRACAUDAL; PERINEURAL AS NEEDED
Status: DISCONTINUED | OUTPATIENT
Start: 2024-06-01 | End: 2024-06-01 | Stop reason: SURG

## 2024-06-01 RX ORDER — SODIUM CHLORIDE, SODIUM LACTATE, POTASSIUM CHLORIDE, CALCIUM CHLORIDE 600; 310; 30; 20 MG/100ML; MG/100ML; MG/100ML; MG/100ML
INJECTION, SOLUTION INTRAVENOUS CONTINUOUS PRN
Status: DISCONTINUED | OUTPATIENT
Start: 2024-06-01 | End: 2024-06-01 | Stop reason: SURG

## 2024-06-01 RX ADMIN — PHENYLEPHRINE HYDROCHLORIDE 200 MCG: 10 INJECTION, SOLUTION INTRAVENOUS at 08:13

## 2024-06-01 RX ADMIN — PROPOFOL 50 MG: 10 INJECTION, EMULSION INTRAVENOUS at 08:00

## 2024-06-01 RX ADMIN — SODIUM CHLORIDE, POTASSIUM CHLORIDE, SODIUM LACTATE AND CALCIUM CHLORIDE: 600; 310; 30; 20 INJECTION, SOLUTION INTRAVENOUS at 07:52

## 2024-06-01 RX ADMIN — CEFAZOLIN SODIUM 2 G: 1 INJECTION, POWDER, FOR SOLUTION INTRAMUSCULAR; INTRAVENOUS at 08:06

## 2024-06-01 RX ADMIN — INDOMETHACIN 50 MG: 25 CAPSULE ORAL at 10:29

## 2024-06-01 RX ADMIN — OXYCODONE HYDROCHLORIDE AND ACETAMINOPHEN 2 TABLET: 5; 325 TABLET ORAL at 10:30

## 2024-06-01 RX ADMIN — FENTANYL CITRATE 100 MCG: 50 INJECTION, SOLUTION INTRAMUSCULAR; INTRAVENOUS at 07:55

## 2024-06-01 RX ADMIN — PHENYLEPHRINE HYDROCHLORIDE 200 MCG: 10 INJECTION, SOLUTION INTRAVENOUS at 08:15

## 2024-06-01 RX ADMIN — SODIUM CHLORIDE, POTASSIUM CHLORIDE, SODIUM LACTATE AND CALCIUM CHLORIDE 100 ML/HR: 600; 310; 30; 20 INJECTION, SOLUTION INTRAVENOUS at 10:33

## 2024-06-01 RX ADMIN — SUCCINYLCHOLINE CHLORIDE 180 MG: 20 INJECTION, SOLUTION INTRAMUSCULAR; INTRAVENOUS at 07:55

## 2024-06-01 RX ADMIN — NEOSTIGMINE METHYLSULFATE 5 MG: 0.5 INJECTION INTRAVENOUS at 08:50

## 2024-06-01 RX ADMIN — PHENYLEPHRINE HYDROCHLORIDE 200 MCG: 10 INJECTION, SOLUTION INTRAVENOUS at 08:18

## 2024-06-01 RX ADMIN — SODIUM CHLORIDE 2000 MG: 900 INJECTION INTRAVENOUS at 23:23

## 2024-06-01 RX ADMIN — OXYCODONE HYDROCHLORIDE AND ACETAMINOPHEN 2 TABLET: 5; 325 TABLET ORAL at 20:50

## 2024-06-01 RX ADMIN — INDOMETHACIN 25 MG: 25 CAPSULE ORAL at 16:03

## 2024-06-01 RX ADMIN — INDOMETHACIN 25 MG: 25 CAPSULE ORAL at 23:20

## 2024-06-01 RX ADMIN — LIDOCAINE HYDROCHLORIDE 50 MG: 10 INJECTION, SOLUTION EPIDURAL; INFILTRATION; INTRACAUDAL; PERINEURAL at 07:55

## 2024-06-01 RX ADMIN — PROPOFOL 250 MG: 10 INJECTION, EMULSION INTRAVENOUS at 07:55

## 2024-06-01 RX ADMIN — SODIUM CHLORIDE 2000 MG: 900 INJECTION INTRAVENOUS at 16:03

## 2024-06-01 RX ADMIN — DOCUSATE SODIUM 100 MG: 100 CAPSULE, LIQUID FILLED ORAL at 20:51

## 2024-06-01 RX ADMIN — FENTANYL CITRATE 50 MCG: 50 INJECTION, SOLUTION INTRAMUSCULAR; INTRAVENOUS at 09:15

## 2024-06-01 RX ADMIN — SODIUM CHLORIDE, POTASSIUM CHLORIDE, SODIUM LACTATE AND CALCIUM CHLORIDE 100 ML/HR: 600; 310; 30; 20 INJECTION, SOLUTION INTRAVENOUS at 23:24

## 2024-06-01 RX ADMIN — ROCURONIUM BROMIDE 5 MG: 10 SOLUTION INTRAVENOUS at 07:55

## 2024-06-01 RX ADMIN — GLYCOPYRROLATE 0.6 MCG: 0.2 INJECTION INTRAMUSCULAR; INTRAVENOUS at 08:50

## 2024-06-01 RX ADMIN — DEXAMETHASONE SODIUM PHOSPHATE 4 MG: 4 INJECTION, SOLUTION INTRAMUSCULAR; INTRAVENOUS at 08:22

## 2024-06-01 RX ADMIN — DOCUSATE SODIUM 100 MG: 100 CAPSULE, LIQUID FILLED ORAL at 10:29

## 2024-06-01 RX ADMIN — ROCURONIUM BROMIDE 45 MG: 10 SOLUTION INTRAVENOUS at 08:02

## 2024-06-01 RX ADMIN — PHENYLEPHRINE HYDROCHLORIDE 200 MCG: 10 INJECTION, SOLUTION INTRAVENOUS at 08:04

## 2024-06-01 RX ADMIN — PHENYLEPHRINE HYDROCHLORIDE 200 MCG: 10 INJECTION, SOLUTION INTRAVENOUS at 08:09

## 2024-06-01 RX ADMIN — SODIUM CHLORIDE, POTASSIUM CHLORIDE, SODIUM LACTATE AND CALCIUM CHLORIDE: 600; 310; 30; 20 INJECTION, SOLUTION INTRAVENOUS at 08:54

## 2024-06-01 NOTE — BRIEF OP NOTE
CHOLECYSTECTOMY LAPAROSCOPIC  Progress Note    Karley Elizondo  6/1/2024    Pre-op Diagnosis:   Acute cholecystitis       Post-Op Diagnosis Codes:  Same    Procedure/CPT® Codes:        Procedure(s):  CHOLECYSTECTOMY LAPAROSCOPIC              Surgeon(s):  Herberth Magdaleno MD    Anesthesia: General    Staff:   Circulator: Kim Herrera RN  Scrub Person: Jewell Costa  Nursing Assistant: Abebe Carpio         Estimated Blood Loss: minimal    Urine Voided: * No values recorded between 6/1/2024  7:52 AM and 6/1/2024  8:54 AM *    Specimens:                Specimens       ID Source Type Tests Collected By Collected At Frozen?    A Gallbladder Tissue TISSUE PATHOLOGY EXAM   Herberth Magdaleno MD 6/1/24 0839 No    This specimen was not marked as sent.                  Drains: * No LDAs found *    Findings: Acutely inflamed gallbladder without perforation        Complications: None          Herberth Magdaleno MD     Date: 6/1/2024  Time: 08:56 EDT

## 2024-06-01 NOTE — ANESTHESIA PREPROCEDURE EVALUATION
Anesthesia Evaluation     Patient summary reviewed and Nursing notes reviewed   no history of anesthetic complications:   NPO Solid Status: > 8 hours  NPO Liquid Status: > 2 hours           Airway   Mallampati: II  TM distance: >3 FB  Neck ROM: full  No difficulty expected  Dental - normal exam     Pulmonary - negative pulmonary ROS and normal exam    breath sounds clear to auscultation  Cardiovascular - negative cardio ROS and normal exam    ECG reviewed  Rhythm: regular  Rate: normal        Neuro/Psych- negative ROS  GI/Hepatic/Renal/Endo      ROS Comment: Biliary colic    Musculoskeletal     Abdominal    Substance History      OB/GYN    (+) Pregnant (23 wks - FHT checked pre-operatively)        Other - negative ROS                   Anesthesia Plan    ASA 2     general   Rapid sequence  (FHT performed pre-operatively, will check post-op  Left uterine displacement during surgery  Avoid teratogenic medications)  intravenous induction     Anesthetic plan, risks, benefits, and alternatives have been provided, discussed and informed consent has been obtained with: patient.    Plan discussed with CRNA.    CODE STATUS:    Level Of Support Discussed With: Patient  Code Status (Patient has no pulse and is not breathing): CPR (Attempt to Resuscitate)  Medical Interventions (Patient has pulse or is breathing): Full Support

## 2024-06-01 NOTE — ANESTHESIA PROCEDURE NOTES
Airway  Urgency: elective    Date/Time: 6/1/2024 7:56 AM  Airway not difficult    General Information and Staff    Patient location during procedure: OR  CRNA/CAA: Adriel Singh CRNA    Indications and Patient Condition  Indications for airway management: airway protection    Preoxygenated: yes  MILS not maintained throughout  Mask difficulty assessment: 0 - not attempted    Final Airway Details  Final airway type: endotracheal airway      Successful airway: ETT  Cuffed: yes   Successful intubation technique: video laryngoscopy and RSI  Facilitating devices/methods: cricoid pressure and intubating stylet  Endotracheal tube insertion site: oral  Blade: [place]  Blade size: 3  ETT size (mm): 7.0  Cormack-Lehane Classification: grade I - full view of glottis  Placement verified by: chest auscultation and capnometry   Cuff volume (mL): 5  Measured from: lips  ETT/EBT  to lips (cm): 21  Number of attempts at approach: 1  Assessment: lips, teeth, and gum same as pre-op and atraumatic intubation    Additional Comments  Negative epigastric sounds, Breath sound equal bilaterally with symmetric chest rise and fall

## 2024-06-01 NOTE — PROGRESS NOTES
Antepartum Progress Note    Patient name: Karley Elizondo  YOB: 1985   MRN: [id number]  Admission Date: 2024  Date of Service: 2024    Karley Elizondo is a 39 y.o.    at 23w3d  admitted on 2024 for Acute cholecystitis due to biliary calculus      Diagnoses:   Patient Active Problem List    Diagnosis     *Acute cholecystitis due to biliary calculus [K80.00]     Intrauterine pregnancy [Z34.90]        Subjective:      Karley has just returned from surgery.  Her pain is well controlled.  No contractions, LOF, VB.  She has only felt rare FM during this pregnancy, so states movement is at baseline. Tolerating clears. No N/V     REVIEW OF SYSTEMS:    All other systems reviewed and are negative    Objective:     Vital signs:  Temp:  [98 °F (36.7 °C)-98.9 °F (37.2 °C)] 98.2 °F (36.8 °C)  Heart Rate:  [51-97] 72  Resp:  [15-20] 16  BP: (105-137)/(58-79) 132/76      PHYSICAL EXAM:  General           Awake, alert, no distress  Abdomen Soft, appropriately tender, gravid  Incisions          Shadowing on RLQ port site bandage, all others wnl  Extremities Calves NT bilaterally.  no lower extremity edema      FHT's: obtained via doppler  TOCO: none         Medications:  bisacodyl, 10 mg, Oral, Daily  ceFAZolin, 2,000 mg, Intravenous, Q8H  docusate sodium, 100 mg, Oral, BID  indomethacin, 25 mg, Oral, Q6H  sodium chloride, 10 mL, Intravenous, Q12H         HYDROmorphone **AND** naloxone    lactated ringers    lidocaine PF 1%    ondansetron ODT **OR** ondansetron    oxyCODONE-acetaminophen    promethazine    sodium chloride    sodium chloride    sodium chloride    Labs:    Lab Results   Component Value Date    WBC 10.21 2024    HGB 11.3 (L) 2024    HCT 32.3 (L) 2024    MCV 93.4 2024     2024    CREATININE 0.44 (L) 2024    AST 11 2024    ALT 12 2024     Results from last 7 days   Lab Units 24  0711   ABO TYPING  A   RH TYPING  Positive   ANTIBODY  SCREEN  Negative       Assessment/Plan:      Karley is a 39 y.o.    at 23w3d POD0 from [facility]  1.   Patient Active Problem List    Diagnosis     *Acute cholecystitis due to biliary calculus [K80.00]     Intrauterine pregnancy [Z34.90]    :     Plan:   1. Continuous toco  2. Indocin 50mg loading dose, then 25mg q6h x 48h  3. Routine postop cares  4. SCDs  5. Intermittent doppler FHT     All questions were answered to the best of my ability.    Greta Lin MD  2024  10:48 EDT

## 2024-06-01 NOTE — OP NOTE
Operative Report    Patient Name:  Karley Elizondo  YOB: 1985  [id number]  6/1/2024      PREOPERATIVE DIAGNOSIS: Acute cholecystitis       POSTOPERATIVE DIAGNOSIS: Same        PROCEDURE PERFORMED:     Laparoscopic cholecystectomy       SURGEON: Herberth Magdaleno MD      ASSISTANT:         SPECIMENS: Gallbladder and contents        ANESTHESIA: General.     EBL: Minimal        FINDINGS:     1. Gallbladder with acute inflammation         INDICATIONS:      The patient is a 39 y.o. female with a history of abdominal pain, concerning for acute cholecystitis.  She is currently in her 23rd week of gestational age with her third intrauterine pregnancy.  Preoperative imaging including U/S confirmed the diagnosis. The risks and benefits of Laparoscopic cholecystectomy were discussed with the patient and their family and they agreed to proceed.        DESCRIPTION OF PROCEDURE:     After obtaining informed consent, the patient was taken to the operating room and placed in the supine position.  A bump was placed under her right side to try and take some of the pressure of the uterus off of the IVC.  After appropriate DVT and antibiotic prophylaxis, general anesthesia was induced. The abdomen was prepped and draped in standard sterile fashion, and after infiltrating the skin with local anesthetic, a 12mm skin incision was made 20 cm inferior to the xiphoid process in the midline.  This was just above the top of the fundus.  Blunt dissection was carried down to the fascia, which was grasped with a Kocher clamp and elevated anteriorly. A vertical midline incision was made in the fascia, and blunt dissection was carried down into the peritoneal cavity. A stay suture of 0 Vicryl was then placed in figure-of-eight fashion around the defect, and a blunt trocar advanced without difficulty into the abdominal cavity.  The abdomen was insufflated with carbon dioxide gas to a pressure of 15 mmHg, and a laparoscope advanced  "through the trocar and the abdominal contents were inspected. There was no evidence of bowel, bladder, or visceral injury with entrance of the trocar.  The gravid uterus was clearly visible and was uninjured. At this point, after infiltrating the skin with local anesthetic, a standard laparoscopic cholecystectomy trocar placement schema was followed.     The gallbladder was grasped and elevated superiorly.  It was acutely inflamed, and could not easily be grasped therefore using an aspiration needle of roughly 20 mL of bile was evacuated to allow for grasping of the gallbladder.  The gallbladder is then retracted superiorly.  There were chronic adhesions of the duodenum to the infundibulum of the gallbladder and these were taken down using blunt and sharp dissection without injury to the duodenum whatsoever.  Using meticulous blunt dissection, the cystic duct and cystic artery were bluntly dissected free of other structures and clearly identified using the \"Critical View\" technique. The cystic artery was then clipped twice proximally and once distally, and divided between the clips.     The cystic duct was then ligated using a 2-0 silk suture tied laparoscopically,  reinforced with hemoclips, and divided.     The gallbladder was then dissected free of the gallbladder fossa using a combination of electrocautery and blunt dissection . There was a small posterior branch of the artery that was clipped and divided . The gallbladder was then placed in an Endo Catch bag, and removed from the inferiormost trocar site. It was inspected on the back table, correlated with intra-operative findings, and passed off as specimen.     The right upper quadrant was then inspected. The cystic duct and cystic artery stumps were intact without bleeding or biliary leak. The right upper quadrant was irrigated with saline until clear. The abdomen was deflated and reinsufflated to make sure pneumoperitoneum was not tamponading any bleeding " and there was none.  The abdomen was again irrigated with saline until clear, and all trocars removed under direct and laparoscopic visualization. The fascia at the inferiormost incision  was closed using the previously placed 0 Vicryl suture. The wounds were irrigated with normal saline, and closed in each area using absorbable subcuticular suture. The incisions were dressed in standard sterile fashion and covered with dry dressings. The patient recovered from anesthesia, was extubated in the operating room, and transferred to the PACU in stable condition.  All sponge and needle counts were correct times two at the completion of the procedure.  Post procedure fetal heart tones will be obtained in PACU.    COMPLICATIONS: None           Herberth Magdaleno MD  6/1/2024  08:57 EDT      Post-op fetal heart tones normal    Herberth Magdaleno MD  09:30 EDT

## 2024-06-01 NOTE — ANESTHESIA POSTPROCEDURE EVALUATION
Patient: Karley Elizondo    Procedure Summary       Date: 06/01/24 Room / Location:  [facility]    Anesthesia Start: 0752 Anesthesia Stop: 0905    Procedure: CHOLECYSTECTOMY LAPAROSCOPIC (Abdomen) Diagnosis:     Surgeons: Herberth Magdaleno MD Provider: Nash Werner MD    Anesthesia Type: general ASA Status: 2            Anesthesia Type: general    Vitals  Vitals Value Taken Time   /78 06/01/24 0902   Temp     Pulse 97 06/01/24 0904   Resp     SpO2 98 % 06/01/24 0904   Vitals shown include unfiled device data.        Post Anesthesia Care and Evaluation    Patient location during evaluation: PACU  Patient participation: complete - patient participated  Level of consciousness: awake  Pain score: 0  Pain management: adequate    Airway patency: patent  Anesthetic complications: No anesthetic complications  PONV Status: none  Cardiovascular status: acceptable and stable  Respiratory status: nasal cannula, unassisted, acceptable and spontaneous ventilation  Hydration status: acceptable

## 2024-06-02 LAB
ALBUMIN SERPL-MCNC: 2.8 G/DL (ref 3.5–5.2)
ALBUMIN/GLOB SERPL: 1.2 G/DL
ALP SERPL-CCNC: 56 U/L (ref 39–117)
ALT SERPL W P-5'-P-CCNC: 15 U/L (ref 1–33)
ANION GAP SERPL CALCULATED.3IONS-SCNC: 9 MMOL/L (ref 5–15)
AST SERPL-CCNC: 16 U/L (ref 1–32)
BASOPHILS # BLD AUTO: 0.02 10*3/MM3 (ref 0–0.2)
BASOPHILS NFR BLD AUTO: 0.3 % (ref 0–1.5)
BILIRUB SERPL-MCNC: 0.4 MG/DL (ref 0–1.2)
BUN SERPL-MCNC: 5 MG/DL (ref 6–20)
BUN/CREAT SERPL: 10.6 (ref 7–25)
CALCIUM SPEC-SCNC: 7.3 MG/DL (ref 8.6–10.5)
CHLORIDE SERPL-SCNC: 110 MMOL/L (ref 98–107)
CO2 SERPL-SCNC: 22 MMOL/L (ref 22–29)
CREAT SERPL-MCNC: 0.47 MG/DL (ref 0.57–1)
DEPRECATED RDW RBC AUTO: 42.9 FL (ref 37–54)
EGFRCR SERPLBLD CKD-EPI 2021: 124.4 ML/MIN/1.73
EOSINOPHIL # BLD AUTO: 0.09 10*3/MM3 (ref 0–0.4)
EOSINOPHIL NFR BLD AUTO: 1.2 % (ref 0.3–6.2)
ERYTHROCYTE [DISTWIDTH] IN BLOOD BY AUTOMATED COUNT: 13.2 % (ref 12.3–15.4)
GLOBULIN UR ELPH-MCNC: 2.3 GM/DL
GLUCOSE SERPL-MCNC: 118 MG/DL (ref 65–99)
HCT VFR BLD AUTO: 27.9 % (ref 34–46.6)
HGB BLD-MCNC: 9.7 G/DL (ref 12–15.9)
IMM GRANULOCYTES # BLD AUTO: 0.08 10*3/MM3 (ref 0–0.05)
IMM GRANULOCYTES NFR BLD AUTO: 1.1 % (ref 0–0.5)
LIPASE SERPL-CCNC: 60 U/L (ref 13–60)
LYMPHOCYTES # BLD AUTO: 2.17 10*3/MM3 (ref 0.7–3.1)
LYMPHOCYTES NFR BLD AUTO: 28.7 % (ref 19.6–45.3)
MCH RBC QN AUTO: 31.8 PG (ref 26.6–33)
MCHC RBC AUTO-ENTMCNC: 34.8 G/DL (ref 31.5–35.7)
MCV RBC AUTO: 91.5 FL (ref 79–97)
MONOCYTES # BLD AUTO: 0.44 10*3/MM3 (ref 0.1–0.9)
MONOCYTES NFR BLD AUTO: 5.8 % (ref 5–12)
NEUTROPHILS NFR BLD AUTO: 4.75 10*3/MM3 (ref 1.7–7)
NEUTROPHILS NFR BLD AUTO: 62.9 % (ref 42.7–76)
NRBC BLD AUTO-RTO: 0 /100 WBC (ref 0–0.2)
PLATELET # BLD AUTO: 219 10*3/MM3 (ref 140–450)
PMV BLD AUTO: 9.6 FL (ref 6–12)
POTASSIUM SERPL-SCNC: 3.6 MMOL/L (ref 3.5–5.2)
PROT SERPL-MCNC: 5.1 G/DL (ref 6–8.5)
RBC # BLD AUTO: 3.05 10*6/MM3 (ref 3.77–5.28)
SODIUM SERPL-SCNC: 141 MMOL/L (ref 136–145)
WBC NRBC COR # BLD AUTO: 7.55 10*3/MM3 (ref 3.4–10.8)

## 2024-06-02 PROCEDURE — 85025 COMPLETE CBC W/AUTO DIFF WBC: CPT | Performed by: SURGERY

## 2024-06-02 PROCEDURE — 80053 COMPREHEN METABOLIC PANEL: CPT | Performed by: SURGERY

## 2024-06-02 PROCEDURE — 25010000002 CEFAZOLIN PER 500 MG: Performed by: SURGERY

## 2024-06-02 PROCEDURE — 83690 ASSAY OF LIPASE: CPT | Performed by: SURGERY

## 2024-06-02 RX ORDER — OXYCODONE HYDROCHLORIDE AND ACETAMINOPHEN 5; 325 MG/1; MG/1
1 TABLET ORAL EVERY 4 HOURS PRN
Qty: 11 TABLET | Refills: 0 | Status: SHIPPED | OUTPATIENT
Start: 2024-06-02

## 2024-06-02 RX ORDER — FERROUS SULFATE 325(65) MG
325 TABLET ORAL
Status: DISCONTINUED | OUTPATIENT
Start: 2024-06-02 | End: 2024-06-03 | Stop reason: HOSPADM

## 2024-06-02 RX ORDER — ONDANSETRON 4 MG/1
4 TABLET, FILM COATED ORAL EVERY 8 HOURS PRN
Qty: 30 TABLET | Refills: 0 | Status: SHIPPED | OUTPATIENT
Start: 2024-06-02

## 2024-06-02 RX ORDER — CEPHALEXIN 500 MG/1
500 CAPSULE ORAL 4 TIMES DAILY
Qty: 20 CAPSULE | Refills: 0 | Status: SHIPPED | OUTPATIENT
Start: 2024-06-02 | End: 2024-06-07

## 2024-06-02 RX ORDER — DOCUSATE SODIUM 100 MG/1
100 CAPSULE, LIQUID FILLED ORAL 2 TIMES DAILY
Qty: 20 CAPSULE | Refills: 0 | Status: SHIPPED | OUTPATIENT
Start: 2024-06-02

## 2024-06-02 RX ORDER — PANTOPRAZOLE SODIUM 40 MG/1
40 TABLET, DELAYED RELEASE ORAL
Status: DISCONTINUED | OUTPATIENT
Start: 2024-06-02 | End: 2024-06-03 | Stop reason: HOSPADM

## 2024-06-02 RX ORDER — CEPHALEXIN 250 MG/1
500 CAPSULE ORAL EVERY 6 HOURS SCHEDULED
Status: DISCONTINUED | OUTPATIENT
Start: 2024-06-03 | End: 2024-06-03 | Stop reason: HOSPADM

## 2024-06-02 RX ADMIN — INDOMETHACIN 25 MG: 25 CAPSULE ORAL at 04:21

## 2024-06-02 RX ADMIN — SODIUM CHLORIDE 2000 MG: 900 INJECTION INTRAVENOUS at 16:26

## 2024-06-02 RX ADMIN — DOCUSATE SODIUM 100 MG: 100 CAPSULE, LIQUID FILLED ORAL at 08:13

## 2024-06-02 RX ADMIN — INDOMETHACIN 25 MG: 25 CAPSULE ORAL at 11:04

## 2024-06-02 RX ADMIN — FERROUS SULFATE TAB 325 MG (65 MG ELEMENTAL FE) 325 MG: 325 (65 FE) TAB at 16:26

## 2024-06-02 RX ADMIN — PANTOPRAZOLE SODIUM 40 MG: 40 TABLET, DELAYED RELEASE ORAL at 08:13

## 2024-06-02 RX ADMIN — INDOMETHACIN 25 MG: 25 CAPSULE ORAL at 22:51

## 2024-06-02 RX ADMIN — BISACODYL 10 MG: 5 TABLET, COATED ORAL at 08:13

## 2024-06-02 RX ADMIN — SODIUM CHLORIDE 2000 MG: 900 INJECTION INTRAVENOUS at 08:13

## 2024-06-02 RX ADMIN — INDOMETHACIN 25 MG: 25 CAPSULE ORAL at 16:26

## 2024-06-02 NOTE — PROGRESS NOTES
"Patient Name:  Karley Elizondo  YOB: 1985  [id number]    Surgery Progress Note    Date of visit: 6/2/2024    Subjective   Subjective: eeling much better, Wants to go home.         Objective     Objective:     /59 (BP Location: Left arm, Patient Position: Lying)   Pulse 82   Temp 98.2 °F (36.8 °C) (Oral)   Resp 16   Ht 170.2 cm (67\")   Wt 81.2 kg (179 lb)   SpO2 94%   BMI 28.04 kg/m²     Intake/Output Summary (Last 24 hours) at 6/2/2024 0820  Last data filed at 6/1/2024 0854  Gross per 24 hour   Intake 1000 ml   Output --   Net 1000 ml       CV:  Rhythm  regular and rate regular   L:  Clear  to auscultation bilaterally   Abd:  Bowel sounds positive , soft, appropriately tender. Dressings c/d/i  Ext:  No cyanosis, clubbing, edema    Recent labs that are back at this time have been reviewed. Fetal heart tones stable.           Assessment/ Plan:    Problem List Items Addressed This Visit          Gastrointestinal Abdominal     * (Principal) Acute cholecystitis due to biliary calculus - Primary- Doing well after Lap [facility]. OK to go home from my standpoint. RTC with me in 4 weeks. Po Keflex to complete a 7 day course.      Relevant Medications    oxyCODONE-acetaminophen (PERCOCET) 5-325 MG per tablet     Other Visit Diagnoses       Intractable abdominal pain        23 weeks gestation of pregnancy        Acute cholecystitis        Relevant Orders    Tissue Pathology Exam             Active Hospital Problems    Diagnosis  POA    **Acute cholecystitis due to biliary calculus [K80.00]  Yes    Intrauterine pregnancy [Z34.90]  Not Applicable      Resolved Hospital Problems   No resolved problems to display.              Herberth Magdaleno MD  6/2/2024  08:20 EDT    F  "

## 2024-06-02 NOTE — PROGRESS NOTES
Antepartum Progress Note    Patient name: Karley Elizondo  YOB: 1985   MRN: [id number]  Admission Date: 2024  Date of Service: 2024    Karley Elizondo is a 39 y.o.    at 23w4d  admitted on 2024 for Acute cholecystitis due to biliary calculus      Diagnoses:   Patient Active Problem List    Diagnosis     *Acute cholecystitis due to biliary calculus [K80.00]     Intrauterine pregnancy [Z34.90]        Subjective:      Karley has no complaints today.  Her pain is very well-controlled.  She is not feeling any cramping or contractions.  No leaking, no bleeding.  Normal fetal movement.  Tolerating regular diet.     REVIEW OF SYSTEMS:    All other systems reviewed and are negative    Objective:     Vital signs:  Temp:  [98 °F (36.7 °C)-99.4 °F (37.4 °C)] 98.2 °F (36.8 °C)  Heart Rate:  [82-98] 98  Resp:  [15-16] 16  BP: (103-125)/(59-76) 123/76      PHYSICAL EXAM:  General           Awake, alert, no distress  Abdomen Soft, appropriately tender, gravid  Incisions          C/D/I  Extremities Calves NT bilaterally.  no lower extremity edema      FHT's: obtained via doppler 150s-160s  TOCO: none         Medications:  bisacodyl, 10 mg, Oral, Daily  ceFAZolin, 2,000 mg, Intravenous, Q8H  docusate sodium, 100 mg, Oral, BID  indomethacin, 25 mg, Oral, Q6H  pantoprazole, 40 mg, Oral, Q AM  sodium chloride, 10 mL, Intravenous, Q12H         HYDROmorphone **AND** naloxone    lactated ringers    lidocaine PF 1%    ondansetron ODT **OR** ondansetron    oxyCODONE-acetaminophen    promethazine    sodium chloride    sodium chloride    sodium chloride    Labs:    Lab Results   Component Value Date    WBC 7.55 2024    HGB 9.7 (L) 2024    HCT 27.9 (L) 2024    MCV 91.5 2024     2024    CREATININE 0.47 (L) 2024    AST 16 2024    ALT 15 2024     Results from last 7 days   Lab Units 24  0711   ABO TYPING  A   RH TYPING  Positive   ANTIBODY SCREEN  Negative        Assessment/Plan:      Karley is a 39 y.o.    at 23w4d POD0 from [facility]  1.   Patient Active Problem List    Diagnosis     *Acute cholecystitis due to biliary calculus [K80.00]     Intrauterine pregnancy [Z34.90]    : anemia    Plan:   1. Continuous toco  2. S/p indocin 50mg loading dose, now to complete 25mg q6h x 48h  3. Routine postop cares  4. SCDs  5. Intermittent doppler FHT  6.  Start p.o. iron  7.  Likely discharge home in the a.m.     All questions were answered to the best of my ability.    Greta Lin MD  2024  10:58 EDT

## 2024-06-03 VITALS
RESPIRATION RATE: 18 BRPM | SYSTOLIC BLOOD PRESSURE: 120 MMHG | WEIGHT: 179 LBS | TEMPERATURE: 98.7 F | BODY MASS INDEX: 28.09 KG/M2 | HEART RATE: 98 BPM | OXYGEN SATURATION: 94 % | DIASTOLIC BLOOD PRESSURE: 64 MMHG | HEIGHT: 67 IN

## 2024-06-03 RX ADMIN — BISACODYL 10 MG: 5 TABLET, COATED ORAL at 08:42

## 2024-06-03 RX ADMIN — INDOMETHACIN 25 MG: 25 CAPSULE ORAL at 05:32

## 2024-06-03 RX ADMIN — INDOMETHACIN 25 MG: 25 CAPSULE ORAL at 11:31

## 2024-06-03 RX ADMIN — CEPHALEXIN 500 MG: 250 CAPSULE ORAL at 00:06

## 2024-06-03 RX ADMIN — CEPHALEXIN 500 MG: 250 CAPSULE ORAL at 05:32

## 2024-06-03 RX ADMIN — PANTOPRAZOLE SODIUM 40 MG: 40 TABLET, DELAYED RELEASE ORAL at 05:34

## 2024-06-03 RX ADMIN — CEPHALEXIN 500 MG: 250 CAPSULE ORAL at 11:32

## 2024-06-03 RX ADMIN — FERROUS SULFATE TAB 325 MG (65 MG ELEMENTAL FE) 325 MG: 325 (65 FE) TAB at 08:42

## 2024-06-03 NOTE — DISCHARGE SUMMARY
Date of Discharge:  6/3/2024    Discharge Diagnosis: IUP @ 23w5d, POD#2 s/p laparoscopic cholecystectomy    Presenting Problem/History of Present Illness  Biliary colic [K80.50]       Hospital Course  Patient is a 39 y.o. female  presented at 23w2d with c/o biliary colic. She was diagnosed with acute cholecystitis and was taken to the OR for laparoscopic cholecystectomy per general surgery. She was given indocin x 48h post-op for uterine quiescence. She had no obstetrical complaints or complications throughout her admission. She has f/u scheduled this week with her primary OB in [place], [place].    Procedures Performed  Procedure(s):  CHOLECYSTECTOMY LAPAROSCOPIC       Consults:   Consults       No orders found from 2024 to 2024.              Labs:  Lab Results   Component Value Date    WBC 7.55 2024    HGB 9.7 (L) 2024    HCT 27.9 (L) 2024    MCV 91.5 2024     2024    CREATININE 0.47 (L) 2024    AST 16 2024    ALT 15 2024     Results from last 7 days   Lab Units 24  0711   ABO TYPING  A   RH TYPING  Positive   ANTIBODY SCREEN  Negative       Discharge Disposition:  To Home    Condition on Discharge:  Stable    Vital Signs  Temp:  [98.2 °F (36.8 °C)-98.6 °F (37 °C)] 98.5 °F (36.9 °C)  Heart Rate:  [80-92] 80  Resp:  [14-16] 16  BP: (103-124)/(62-74) 103/62    Discharge Disposition  Home or Self Care    Discharge Medications     Discharge Medications        New Medications        Instructions Start Date   cephalexin 500 MG capsule  Commonly known as: KEFLEX   500 mg, Oral, 4 Times Daily      docusate sodium 100 MG capsule  Commonly known as: COLACE   100 mg, Oral, 2 Times Daily      ondansetron 4 MG tablet  Commonly known as: ZOFRAN   4 mg, Oral, Every 8 Hours PRN      oxyCODONE-acetaminophen 5-325 MG per tablet  Commonly known as: PERCOCET   1 tablet, Oral, Every 4 Hours PRN               Discharge Diet: Regular    Activity at Discharge:  Restrictions per general surgery    Follow-up Appointments  No future appointments.  Additional Instructions for the Follow-ups that You Need to Schedule       Discharge Follow-up with Specialty: Dr. Magdaleno; 1 Month   As directed      Specialty: Dr. Magdaleno   Follow Up: 1 Month        Discharge Follow-up with Specified Provider: primary OB; Dr. Magdaleno   As directed      To: primary OB; Dr. Magdaleno   Follow Up Details: as scheduled; 4 weeks                Test Results Pending at Discharge  Pending Labs       Order Current Status    Tissue Pathology Exam In process             Angie Biswas MD  06/03/24  08:42 EDT    Time: 20 minutes

## 2024-06-03 NOTE — PROGRESS NOTES
"Patient Name:  Karley Elizondo  YOB: 1985  [id number]    Surgery Progress Note    Date of visit: 6/3/2024    Subjective   Subjective: Feeling better. Toleratng PO, ready to go home.         Objective     Objective:     /62 (BP Location: Left arm, Patient Position: Lying)   Pulse 81   Temp 98.2 °F (36.8 °C) (Oral)   Resp 14   Ht 170.2 cm (67\")   Wt 81.2 kg (179 lb)   SpO2 94%   BMI 28.04 kg/m²   No intake or output data in the 24 hours ending 06/03/24 0647    CV:  Rhythm  regular and rate regular   L:  Clear  to auscultation bilaterally   Abd:  Bowel sounds positive , soft, minimally tender. Dressings c/d/i  Ext:  No cyanosis, clubbing, edema    Recent labs that are back at this time have been reviewed.            Assessment/ Plan:    Problem List Items Addressed This Visit          Gastrointestinal Abdominal     * (Principal) Acute cholecystitis due to biliary calculus - Primary- Doing well after Lap [facility]. OK to go home from my standpoint, RTC with me in 4 weeks.      Relevant Medications    oxyCODONE-acetaminophen (PERCOCET) 5-325 MG per tablet     Other Visit Diagnoses       Intractable abdominal pain        23 weeks gestation of pregnancy        Acute cholecystitis        Relevant Orders    Tissue Pathology Exam             Active Hospital Problems    Diagnosis  POA    **Acute cholecystitis due to biliary calculus [K80.00]  Yes    Intrauterine pregnancy [Z34.90]  Not Applicable      Resolved Hospital Problems   No resolved problems to display.              Herberth Magdaleno MD  6/3/2024  06:47 EDT      "

## 2024-06-03 NOTE — PROGRESS NOTES
Antepartum Progress Note    Patient name: Karley Elizondo  YOB: 1985   MRN: [id number]  Admission Date: 2024  Date of Service: 6/3/2024    Karley Elizondo is a 39 y.o.    at 23w5d  admitted on 2024 for Acute cholecystitis due to biliary calculus      Diagnoses:   Patient Active Problem List    Diagnosis     *Acute cholecystitis due to biliary calculus [K80.00]     Intrauterine pregnancy [Z34.90]        Subjective:      Karley has no complaints today.  Her pain is very well-controlled.  She is not feeling any cramping or contractions.  No leaking, no bleeding.  Normal fetal movement.  Tolerating regular diet.     REVIEW OF SYSTEMS:    All other systems reviewed and are negative    Objective:     Vital signs:  Temp:  [98.2 °F (36.8 °C)-98.6 °F (37 °C)] 98.5 °F (36.9 °C)  Heart Rate:  [80-92] 80  Resp:  [14-16] 16  BP: (103-124)/(62-74) 103/62      PHYSICAL EXAM:  General           Awake, alert, no distress  Abdomen Soft, appropriately tender, gravid  Incisions          C/D/I  Extremities Calves NT bilaterally.  no lower extremity edema      FHT's: obtained via doppler 150s-160s  TOCO: none         Medications:  bisacodyl, 10 mg, Oral, Daily  cephalexin, 500 mg, Oral, Q6H  docusate sodium, 100 mg, Oral, BID  ferrous sulfate, 325 mg, Oral, Daily With Breakfast  indomethacin, 25 mg, Oral, Q6H  pantoprazole, 40 mg, Oral, Q AM  sodium chloride, 10 mL, Intravenous, Q12H         HYDROmorphone **AND** naloxone    lactated ringers    lidocaine PF 1%    ondansetron ODT **OR** ondansetron    oxyCODONE-acetaminophen    promethazine    sodium chloride    sodium chloride    sodium chloride    Labs:    Lab Results   Component Value Date    WBC 7.55 2024    HGB 9.7 (L) 2024    HCT 27.9 (L) 2024    MCV 91.5 2024     2024    CREATININE 0.47 (L) 2024    AST 16 2024    ALT 15 2024     Results from last 7 days   Lab Units 24  0711   ABO TYPING  A   RH  TYPING  Positive   ANTIBODY SCREEN  Negative       Assessment/Plan:      Karley is a 39 y.o.    at 23w5d [place]2 [place] [facility]  [place].   Patient Active Problem List    Diagnosis     *Acute cholecystitis due to biliary calculus [K80.00]     Intrauterine pregnancy [Z34.90]    : anemia    Plan:   1. Continuous toco  2. S/p indocin 50mg loading dose, now to complete 25mg q6h x 48h  3. Routine postop cares  4. SCDs  5. Intermittent doppler FHT  6.  Start p.o. iron  7.  Discharge home after last dose of indocin this AM; has f/u scheduled with primary OB on      All questions were answered to the best of my ability.    Angie Biswas MD  6/3/2024  08:39 EDT

## 2024-06-03 NOTE — NURSING NOTE
Patient right lower dressing shows old drainage present. This nurse will perform dressing change with A.M. med pass.

## 2024-06-04 LAB
CYTO UR: NORMAL
LAB AP CASE REPORT: NORMAL
LAB AP CLINICAL INFORMATION: NORMAL
PATH REPORT.FINAL DX SPEC: NORMAL
PATH REPORT.GROSS SPEC: NORMAL

## (undated) DEVICE — ENDOPOUCH RETRIEVER SPECIMEN RETRIEVAL BAGS: Brand: ENDOPOUCH RETRIEVER

## (undated) DEVICE — PACK PROCEDURE SURG VAG DEL REV

## (undated) DEVICE — Z DISCONTINUED USE 2659136 CANNULA VAC DIA12MM CRV SEMI RIG W/ ROUNDED TIP TAPR END

## (undated) DEVICE — LAPAROVUE VISIBILITY SYSTEM LAPAROSCOPIC SOLUTIONS: Brand: LAPAROVUE

## (undated) DEVICE — SOLUTION IV IRRIG WATER 500ML POUR BRL ST 2F7113

## (undated) DEVICE — GLOVE,SURG,SENSICARE SLT,LF,PF,7.5: Brand: MEDLINE

## (undated) DEVICE — GLOVE,SURG,SENSICARE SLT,LF,PF,6.5: Brand: MEDLINE

## (undated) DEVICE — HOSE CONN L18IN UTER DISP FOR BERK SAFETOUCH SYS

## (undated) DEVICE — SET COLL TBNG L6FT DIA3/8IN W/ INTEGR SWVL HNDL SLIP RNG M

## (undated) DEVICE — CATHETER,URETHRAL,VINYL,MALE,16",16 FR: Brand: MEDLINE

## (undated) DEVICE — COVER US PRB W13XL244CM SURGICAL INTRAOPERATIVE W RUBBERBAND

## (undated) DEVICE — ENDOPATH XCEL BLADELESS TROCARS WITH STABILITY SLEEVES: Brand: ENDOPATH XCEL

## (undated) DEVICE — ENDOCUT SCISSOR TIP, DISPOSABLE: Brand: RENEW

## (undated) DEVICE — GOWN SURG ORBIS LVL3 2XL STRL

## (undated) DEVICE — CURETTE SURG VAC 11 MM CRV RIGID PLAS

## (undated) DEVICE — PAD,NON-ADHERENT,3X8,STERILE,LF,1/PK: Brand: MEDLINE

## (undated) DEVICE — Z DISCONTINUED USE 2659131 CANNULA VAC DIA7MM FLX ROUNDED TIP W/ 2 PRT HI QUAL DISP

## (undated) DEVICE — SUT VIC 0 UR6 27IN VCP603H

## (undated) DEVICE — GLV SURG SENSICARE PI MIC PF SZ7.5 LF STRL

## (undated) DEVICE — BLANKT WARM UPPR/BDY ARM/OUT 57X196CM

## (undated) DEVICE — CANISTER, RIGID, 1200CC: Brand: MEDLINE INDUSTRIES, INC.

## (undated) DEVICE — [HIGH FLOW INSUFFLATOR,  DO NOT USE IF PACKAGE IS DAMAGED,  KEEP DRY,  KEEP AWAY FROM SUNLIGHT,  PROTECT FROM HEAT AND RADIOACTIVE SOURCES.]: Brand: PNEUMOSURE

## (undated) DEVICE — PATIENT RETURN ELECTRODE, SINGLE-USE, CONTACT QUALITY MONITORING, ADULT, WITH 9FT CORD, FOR PATIENTS WEIGING OVER 33LBS. (15KG): Brand: MEGADYNE

## (undated) DEVICE — SPONGE LAP W18XL18IN WHT COT 4 PLY FLD STRUNG RADPQ DISP ST

## (undated) DEVICE — LAPAROSCOPIC SMOKE FILTRATION SYSTEM: Brand: PALL LAPAROSHIELD® PLUS LAPAROSCOPIC SMOKE FILTRATION SYSTEM

## (undated) DEVICE — TRAP TISS DISP FOR COLL SYS BERK SAFETOUCH

## (undated) DEVICE — ANTIBACTERIAL UNDYED BRAIDED (POLYGLACTIN 910), SYNTHETIC ABSORBABLE SUTURE: Brand: COATED VICRYL

## (undated) DEVICE — ENDOPATH XCEL UNIVERSAL TROCAR STABLILITY SLEEVES: Brand: ENDOPATH XCEL

## (undated) DEVICE — GLV SURG SENSICARE PI MIC PF SZ7 LF STRL

## (undated) DEVICE — Z DISCONTINUED USE 2659140 CANNULA VAC DIA8MM STR SEMI RIG ROUNDED TIP DISP BERK

## (undated) DEVICE — Z DISCONTINUED USE  2659134 CANNULA VAC DIA10MM CRV SEMI RIG W/ ROUNDED TIP TAPR END

## (undated) DEVICE — TRAY SKIN PREP GELWITH 2 SPNG

## (undated) DEVICE — SUT SILK 2/0 TIES 18IN A185H

## (undated) DEVICE — Z DISCONTINUED USE 2659141 CANNULA VAC DIA9MM STR SEMI RIG ROUNDED TIP DISP BERK

## (undated) DEVICE — COVER LT HNDL BLU PLAS

## (undated) DEVICE — Z DISCONTINUED USE 2659139 CANNULA VAC DIA7MM STR SEMI RIG ROUNDED TIP DISP BERK

## (undated) DEVICE — PK LAP LASR CHOLE 10